# Patient Record
Sex: MALE | Race: WHITE
[De-identification: names, ages, dates, MRNs, and addresses within clinical notes are randomized per-mention and may not be internally consistent; named-entity substitution may affect disease eponyms.]

---

## 2017-08-04 ENCOUNTER — HOSPITAL ENCOUNTER (INPATIENT)
Dept: HOSPITAL 63 - 1 SOUTH | Age: 72
LOS: 1 days | Discharge: HOME | DRG: 293 | End: 2017-08-05
Attending: FAMILY MEDICINE | Admitting: FAMILY MEDICINE
Payer: MEDICARE

## 2017-08-04 VITALS — BODY MASS INDEX: 33.69 KG/M2 | HEIGHT: 69 IN | WEIGHT: 227.5 LBS

## 2017-08-04 VITALS — DIASTOLIC BLOOD PRESSURE: 70 MMHG | SYSTOLIC BLOOD PRESSURE: 136 MMHG

## 2017-08-04 VITALS — SYSTOLIC BLOOD PRESSURE: 137 MMHG | DIASTOLIC BLOOD PRESSURE: 78 MMHG

## 2017-08-04 VITALS — DIASTOLIC BLOOD PRESSURE: 60 MMHG | SYSTOLIC BLOOD PRESSURE: 112 MMHG

## 2017-08-04 DIAGNOSIS — G47.30: ICD-10-CM

## 2017-08-04 DIAGNOSIS — I25.10: ICD-10-CM

## 2017-08-04 DIAGNOSIS — I50.43: Primary | ICD-10-CM

## 2017-08-04 DIAGNOSIS — Z79.899: ICD-10-CM

## 2017-08-04 DIAGNOSIS — Z91.048: ICD-10-CM

## 2017-08-04 DIAGNOSIS — Z80.9: ICD-10-CM

## 2017-08-04 DIAGNOSIS — Z79.82: ICD-10-CM

## 2017-08-04 DIAGNOSIS — Z95.5: ICD-10-CM

## 2017-08-04 LAB
ALBUMIN SERPL-MCNC: 3.9 G/DL (ref 3.4–5)
ALBUMIN/GLOB SERPL: 1.3 {RATIO} (ref 1–1.7)
ALP SERPL-CCNC: 48 U/L (ref 46–116)
ALT SERPL-CCNC: 35 U/L (ref 16–63)
ANION GAP SERPL CALC-SCNC: 5 MMOL/L (ref 6–14)
APTT PPP: (no result) S
AST SERPL-CCNC: 24 U/L (ref 15–37)
BACTERIA #/AREA URNS HPF: 0 /HPF
BASOPHILS # BLD AUTO: 0.1 X10^3/UL (ref 0–0.2)
BASOPHILS NFR BLD: 1 % (ref 0–3)
BILIRUB SERPL-MCNC: 0.3 MG/DL (ref 0.2–1)
BILIRUB UR QL STRIP: (no result)
BUN/CREAT SERPL: 16 (ref 6–20)
CA-I SERPL ISE-MCNC: 18 MG/DL (ref 8–26)
CALCIUM SERPL-MCNC: 10 MG/DL (ref 8.5–10.1)
CHLORIDE SERPL-SCNC: 107 MMOL/L (ref 98–107)
CK SERPL-CCNC: 222 U/L (ref 39–308)
CO2 SERPL-SCNC: 30 MMOL/L (ref 21–32)
CREAT SERPL-MCNC: 1.1 MG/DL (ref 0.7–1.3)
EOSINOPHIL NFR BLD: 0.2 X10^3/UL (ref 0–0.7)
EOSINOPHIL NFR BLD: 2 % (ref 0–3)
ERYTHROCYTE [DISTWIDTH] IN BLOOD BY AUTOMATED COUNT: 13.6 % (ref 11.5–14.5)
FIBRINOGEN PPP-MCNC: CLEAR MG/DL
GFR SERPLBLD BASED ON 1.73 SQ M-ARVRAT: 65.6 ML/MIN
GLOBULIN SER-MCNC: 3 G/DL (ref 2.2–3.8)
GLUCOSE SERPL-MCNC: 144 MG/DL (ref 70–99)
GLUCOSE UR STRIP-MCNC: (no result) MG/DL
HCT VFR BLD CALC: 44 % (ref 39–53)
HGB BLD-MCNC: 14.6 G/DL (ref 13–17.5)
LYMPHOCYTES # BLD: 2.2 X10^3/UL (ref 1–4.8)
LYMPHOCYTES NFR BLD AUTO: 34 % (ref 24–48)
MCH RBC QN AUTO: 30 PG (ref 25–35)
MCHC RBC AUTO-ENTMCNC: 33 G/DL (ref 31–37)
MCV RBC AUTO: 91 FL (ref 79–100)
MONO #: 0.7 X10^3/UL (ref 0–1.1)
MONOCYTES NFR BLD: 11 % (ref 0–9)
NEUT #: 3.4 X10^3UL (ref 1.8–7.7)
NEUTROPHILS NFR BLD AUTO: 52 % (ref 31–73)
NITRITE UR QL STRIP: (no result)
PLATELET # BLD AUTO: 166 X10^3/UL (ref 140–400)
POTASSIUM SERPL-SCNC: 4.2 MMOL/L (ref 3.5–5.1)
PROT SERPL-MCNC: 6.9 G/DL (ref 6.4–8.2)
RBC # BLD AUTO: 4.83 X10^6/UL (ref 4.3–5.7)
RBC #/AREA URNS HPF: (no result) /HPF (ref 0–2)
SODIUM SERPL-SCNC: 142 MMOL/L (ref 136–145)
SP GR UR STRIP: 1.01
SQUAMOUS #/AREA URNS LPF: (no result) /LPF
UROBILINOGEN UR-MCNC: 0.2 MG/DL
WBC # BLD AUTO: 6.5 X10^3/UL (ref 4–11)
WBC #/AREA URNS HPF: 0 /HPF (ref 0–4)

## 2017-08-04 PROCEDURE — 90732 PPSV23 VACC 2 YRS+ SUBQ/IM: CPT

## 2017-08-04 PROCEDURE — 93306 TTE W/DOPPLER COMPLETE: CPT

## 2017-08-04 PROCEDURE — 80048 BASIC METABOLIC PNL TOTAL CA: CPT

## 2017-08-04 PROCEDURE — 81001 URINALYSIS AUTO W/SCOPE: CPT

## 2017-08-04 PROCEDURE — 82550 ASSAY OF CK (CPK): CPT

## 2017-08-04 PROCEDURE — 36415 COLL VENOUS BLD VENIPUNCTURE: CPT

## 2017-08-04 PROCEDURE — 80053 COMPREHEN METABOLIC PANEL: CPT

## 2017-08-04 PROCEDURE — 84484 ASSAY OF TROPONIN QUANT: CPT

## 2017-08-04 PROCEDURE — 85379 FIBRIN DEGRADATION QUANT: CPT

## 2017-08-04 PROCEDURE — 85027 COMPLETE CBC AUTOMATED: CPT

## 2017-08-04 PROCEDURE — 83880 ASSAY OF NATRIURETIC PEPTIDE: CPT

## 2017-08-04 RX ADMIN — FUROSEMIDE SCH MG: 10 INJECTION, SOLUTION INTRAMUSCULAR; INTRAVENOUS at 20:40

## 2017-08-04 RX ADMIN — NIACIN SCH MG: 500 TABLET, FILM COATED, EXTENDED RELEASE ORAL at 20:40

## 2017-08-04 RX ADMIN — Medication SCH MG: at 20:40

## 2017-08-04 NOTE — NUR
The patient, NAYELY BECKER, 71 y/o, M admitted by MEHDI ROSALES MD, was given 
written information regarding hospital policies, unit procedures and contact persons.  



Patient admitted to room 124 from Dr. Rosales's office and arrived at approximately 1500. 
Valuables were checked and left with patient. Vital signs assessed and IV placed.

## 2017-08-05 VITALS
SYSTOLIC BLOOD PRESSURE: 132 MMHG | DIASTOLIC BLOOD PRESSURE: 80 MMHG | SYSTOLIC BLOOD PRESSURE: 132 MMHG | DIASTOLIC BLOOD PRESSURE: 80 MMHG

## 2017-08-05 VITALS — SYSTOLIC BLOOD PRESSURE: 135 MMHG | DIASTOLIC BLOOD PRESSURE: 58 MMHG

## 2017-08-05 VITALS — SYSTOLIC BLOOD PRESSURE: 153 MMHG | DIASTOLIC BLOOD PRESSURE: 84 MMHG

## 2017-08-05 LAB
ANION GAP SERPL CALC-SCNC: 4 MMOL/L (ref 6–14)
BASOPHILS # BLD AUTO: 0.1 X10^3/UL (ref 0–0.2)
BASOPHILS NFR BLD: 1 % (ref 0–3)
CA-I SERPL ISE-MCNC: 19 MG/DL (ref 8–26)
CALCIUM SERPL-MCNC: 10.1 MG/DL (ref 8.5–10.1)
CHLORIDE SERPL-SCNC: 107 MMOL/L (ref 98–107)
CO2 SERPL-SCNC: 31 MMOL/L (ref 21–32)
CREAT SERPL-MCNC: 1.1 MG/DL (ref 0.7–1.3)
EOSINOPHIL NFR BLD: 0.2 X10^3/UL (ref 0–0.7)
EOSINOPHIL NFR BLD: 4 % (ref 0–3)
ERYTHROCYTE [DISTWIDTH] IN BLOOD BY AUTOMATED COUNT: 13.3 % (ref 11.5–14.5)
GFR SERPLBLD BASED ON 1.73 SQ M-ARVRAT: 65.6 ML/MIN
GLUCOSE SERPL-MCNC: 135 MG/DL (ref 70–99)
HCT VFR BLD CALC: 44.2 % (ref 39–53)
HGB BLD-MCNC: 14.7 G/DL (ref 13–17.5)
LYMPHOCYTES # BLD: 2.4 X10^3/UL (ref 1–4.8)
LYMPHOCYTES NFR BLD AUTO: 36 % (ref 24–48)
MCH RBC QN AUTO: 30 PG (ref 25–35)
MCHC RBC AUTO-ENTMCNC: 33 G/DL (ref 31–37)
MCV RBC AUTO: 91 FL (ref 79–100)
MONO #: 0.8 X10^3/UL (ref 0–1.1)
MONOCYTES NFR BLD: 12 % (ref 0–9)
NEUT #: 3.1 X10^3UL (ref 1.8–7.7)
NEUTROPHILS NFR BLD AUTO: 47 % (ref 31–73)
PLATELET # BLD AUTO: 159 X10^3/UL (ref 140–400)
POTASSIUM SERPL-SCNC: 4.8 MMOL/L (ref 3.5–5.1)
RBC # BLD AUTO: 4.88 X10^6/UL (ref 4.3–5.7)
SODIUM SERPL-SCNC: 142 MMOL/L (ref 136–145)
WBC # BLD AUTO: 6.6 X10^3/UL (ref 4–11)

## 2017-08-05 RX ADMIN — Medication SCH MG: at 08:49

## 2017-08-05 RX ADMIN — FUROSEMIDE SCH MG: 10 INJECTION, SOLUTION INTRAMUSCULAR; INTRAVENOUS at 08:50

## 2017-08-05 RX ADMIN — NIACIN SCH MG: 500 TABLET, FILM COATED, EXTENDED RELEASE ORAL at 08:49

## 2017-08-05 NOTE — CARD
--------------- APPROVED REPORT --------------





EXAM: Two-dimensional and M-mode echocardiogram with Doppler and color Doppler.



Other Information 

Quality : GoodHR: 76bpm

Rhythm : NSR



INDICATION

Congestive Heart Failure 



RISK FACTORS

Obesity   



2D DIMENSIONS 

RVDd3.4 (2.9-3.5cm)Left Atrium(2D)3.6 (1.6-4.0cm)

IVSd0.8 (0.7-1.1cm)Aortic Root(2D)2.8 (2.0-3.7cm)

LVDd4.6 (3.9-5.9cm)LVOT Diameter2.2 (1.8-2.4cm)

PWd0.8 (0.7-1.1cm)LVDs2.8 (2.5-4.0cm)

FS (%) 39.4 %SV66.7 ml



Aortic Valve

AoV Peak Duane.177.6cm/sAoV VTI32.3cm

AO Peak GR.12.6mmHgLVOT Peak Duane.162.7cm/s

LVOT  VTI 34.60cmAO Mean GR.8mmHg

LOREE (VMAX)3.24so8UOY   (VTI)4.17cm2



Mitral Valve

MV E Jkxcdhss56.3cm/sMV E Peak Gr.4mmHg

MV DECEL [id number] A Cxoiryed29.8cm/s

MV E Mean Gr.2mmHgE/A  Ratio0.8

MV A [id number]



Pulmonary Valve

PV Peak Jejuqkgj094.4cm/sPV Peak Grad.10mmHg



Pulmonary Vein

S1 Yryypzjc25.7cm/sD2 Uiwzhpzn32.4cm/s



 LEFT VENTRICLE 

The left ventricle is normal size. There is borderline concentric left ventricular hypertrophy. The l
eft ventricular systolic function is normal and the ejection fraction is within normal range. The Eje
ction Fraction is 65-70%. There is normal LV segmental wall motion. Transmitral Doppler flow pattern 
is Grade I-abnormal relaxation pattern.



 RIGHT VENTRICLE 

The right ventricle is normal size. There is normal right ventricular wall thickness. The right ventr
icular systolic function is normal.



 ATRIA 

The left atrium size is normal. The right atrium size is normal. The interatrial septum is intact wit
h no evidence for an atrial septal defect or patent foramen ovale as noted on 2-D or Doppler imaging.




 AORTIC VALVE 

The aortic valve is mildly sclerotic. The aortic valve is trileaflet. Doppler and Color Flow revealed
 no significant aortic regurgitation. There is no significant aortic valvular stenosis.



 MITRAL VALVE 

Mitral annular calcification is mild. The mitral valve leaflets are thickened. There is no evidence o
f mitral valve prolapse. There is no mitral valve stenosis. Doppler and Color Flow revealed trace tyra
ral valve regurgitation.



 TRICUSPID VALVE 

Doppler and Color Flow revealed trace tricuspid valve regurgitation.



 PULMONIC VALVE 

The pulmonic valve is not well visualized but appears to open well. No evidence of pulmonic valve reg
urgitation or stenosis by color and spectral Doppler.



 GREAT VESSELS 

The aortic root is normal in size. The ascending aorta is normal in size. The pulmonary artery is nor
mal. The IVC is normal in size and collapses >50% with inspiration.



 PERICARDIAL EFFUSION 

There is no evidence of significant pericardial effusion.



Critical Notification

Critical Value: No



<Conclusion>

The left ventricle is normal size.

The left ventricular systolic function is normal and the ejection fraction is within normal range.

The Ejection Fraction is 65-70%.

There is borderline concentric left ventricular hypertrophy.

There is no significant aortic valvular stenosis.

Doppler and Color Flow revealed no significant aortic regurgitation.

Doppler and Color Flow revealed trace mitral valve regurgitation.

Doppler and Color Flow revealed trace tricuspid valve regurgitation.

There is no evidence of significant pericardial effusion.

## 2017-12-02 ENCOUNTER — HOSPITAL ENCOUNTER (OUTPATIENT)
Dept: HOSPITAL 63 - LAB | Age: 72
Discharge: HOME | End: 2017-12-02
Attending: FAMILY MEDICINE
Payer: MEDICARE

## 2017-12-02 DIAGNOSIS — R04.0: Primary | ICD-10-CM

## 2017-12-02 LAB
ANION GAP SERPL CALC-SCNC: 7 MMOL/L (ref 6–14)
BASOPHILS # BLD AUTO: 0.1 X10^3/UL (ref 0–0.2)
BASOPHILS NFR BLD: 1 % (ref 0–3)
CA-I SERPL ISE-MCNC: 14 MG/DL (ref 8–26)
CALCIUM SERPL-MCNC: 10.3 MG/DL (ref 8.5–10.1)
CHLORIDE SERPL-SCNC: 104 MMOL/L (ref 98–107)
CO2 SERPL-SCNC: 31 MMOL/L (ref 21–32)
CREAT SERPL-MCNC: 1 MG/DL (ref 0.7–1.3)
EOSINOPHIL NFR BLD: 0.2 X10^3/UL (ref 0–0.7)
EOSINOPHIL NFR BLD: 2 % (ref 0–3)
ERYTHROCYTE [DISTWIDTH] IN BLOOD BY AUTOMATED COUNT: 13.5 % (ref 11.5–14.5)
GFR SERPLBLD BASED ON 1.73 SQ M-ARVRAT: 73.2 ML/MIN
GLUCOSE SERPL-MCNC: 90 MG/DL (ref 70–99)
HCT VFR BLD CALC: 45 % (ref 39–53)
HGB BLD-MCNC: 15.5 G/DL (ref 13–17.5)
LYMPHOCYTES # BLD: 2.4 X10^3/UL (ref 1–4.8)
LYMPHOCYTES NFR BLD AUTO: 26 % (ref 24–48)
MCH RBC QN AUTO: 31 PG (ref 25–35)
MCHC RBC AUTO-ENTMCNC: 34 G/DL (ref 31–37)
MCV RBC AUTO: 90 FL (ref 79–100)
MONO #: 0.8 X10^3/UL (ref 0–1.1)
MONOCYTES NFR BLD: 9 % (ref 0–9)
NEUT #: 5.9 X10^3UL (ref 1.8–7.7)
NEUTROPHILS NFR BLD AUTO: 63 % (ref 31–73)
PLATELET # BLD AUTO: 204 X10^3/UL (ref 140–400)
POTASSIUM SERPL-SCNC: 4.1 MMOL/L (ref 3.5–5.1)
RBC # BLD AUTO: 5.02 X10^6/UL (ref 4.3–5.7)
SODIUM SERPL-SCNC: 142 MMOL/L (ref 136–145)
WBC # BLD AUTO: 9.2 X10^3/UL (ref 4–11)

## 2017-12-02 PROCEDURE — 85025 COMPLETE CBC W/AUTO DIFF WBC: CPT

## 2017-12-02 PROCEDURE — 80048 BASIC METABOLIC PNL TOTAL CA: CPT

## 2017-12-02 PROCEDURE — 36415 COLL VENOUS BLD VENIPUNCTURE: CPT

## 2017-12-02 PROCEDURE — 85610 PROTHROMBIN TIME: CPT

## 2021-08-05 ENCOUNTER — HOSPITAL ENCOUNTER (INPATIENT)
Dept: HOSPITAL 63 - 1 SOUTH | Age: 76
LOS: 5 days | Discharge: ANOTHER HOSPITAL | DRG: 186 | End: 2021-08-10
Attending: FAMILY MEDICINE | Admitting: FAMILY MEDICINE
Payer: MEDICARE

## 2021-08-05 VITALS — SYSTOLIC BLOOD PRESSURE: 117 MMHG | DIASTOLIC BLOOD PRESSURE: 81 MMHG

## 2021-08-05 VITALS — HEIGHT: 68 IN | WEIGHT: 190.04 LBS | BODY MASS INDEX: 28.8 KG/M2

## 2021-08-05 DIAGNOSIS — Z95.828: ICD-10-CM

## 2021-08-05 DIAGNOSIS — Z98.61: ICD-10-CM

## 2021-08-05 DIAGNOSIS — I25.10: ICD-10-CM

## 2021-08-05 DIAGNOSIS — E43: ICD-10-CM

## 2021-08-05 DIAGNOSIS — E66.01: ICD-10-CM

## 2021-08-05 DIAGNOSIS — J90: Primary | ICD-10-CM

## 2021-08-05 DIAGNOSIS — E11.9: ICD-10-CM

## 2021-08-05 LAB
ALBUMIN SERPL-MCNC: 2.7 G/DL (ref 3.4–5)
ALBUMIN/GLOB SERPL: 0.7 {RATIO} (ref 1–1.7)
ALP SERPL-CCNC: 86 U/L (ref 46–116)
ALT SERPL-CCNC: 15 U/L (ref 16–63)
ANION GAP SERPL CALC-SCNC: 5 MMOL/L (ref 6–14)
APTT PPP: (no result) S
AST SERPL-CCNC: 15 U/L (ref 15–37)
BACTERIA #/AREA URNS HPF: 0 /HPF
BASOPHILS # BLD AUTO: 0.1 X10^3/UL (ref 0–0.2)
BASOPHILS NFR BLD: 1 % (ref 0–3)
BILIRUB SERPL-MCNC: 0.2 MG/DL (ref 0.2–1)
BILIRUB UR QL STRIP: (no result)
BUN/CREAT SERPL: 11 (ref 6–20)
CA-I SERPL ISE-MCNC: 10 MG/DL (ref 8–26)
CALCIUM SERPL-MCNC: 9.8 MG/DL (ref 8.5–10.1)
CHLORIDE SERPL-SCNC: 103 MMOL/L (ref 98–107)
CO2 SERPL-SCNC: 30 MMOL/L (ref 21–32)
CREAT SERPL-MCNC: 0.9 MG/DL (ref 0.7–1.3)
EOSINOPHIL NFR BLD: 0.4 X10^3/UL (ref 0–0.7)
EOSINOPHIL NFR BLD: 4 % (ref 0–3)
ERYTHROCYTE [DISTWIDTH] IN BLOOD BY AUTOMATED COUNT: 15.1 % (ref 11.5–14.5)
FIBRINOGEN PPP-MCNC: CLEAR MG/DL
GFR SERPLBLD BASED ON 1.73 SQ M-ARVRAT: 81.8 ML/MIN
GLOBULIN SER-MCNC: 4 G/DL (ref 2.2–3.8)
GLUCOSE SERPL-MCNC: 156 MG/DL (ref 70–99)
GLUCOSE UR STRIP-MCNC: (no result) MG/DL
HCT VFR BLD CALC: 36.4 % (ref 39–53)
HGB BLD-MCNC: 11.7 G/DL (ref 13–17.5)
LYMPHOCYTES # BLD: 1.5 X10^3/UL (ref 1–4.8)
LYMPHOCYTES NFR BLD AUTO: 15 % (ref 24–48)
MAGNESIUM SERPL-MCNC: 2.1 MG/DL (ref 1.8–2.4)
MCH RBC QN AUTO: 26 PG (ref 25–35)
MCHC RBC AUTO-ENTMCNC: 32 G/DL (ref 31–37)
MCV RBC AUTO: 80 FL (ref 79–100)
MONO #: 1 X10^3/UL (ref 0–1.1)
MONOCYTES NFR BLD: 10 % (ref 0–9)
NEUT #: 7.2 X10^3UL (ref 1.8–7.7)
NEUTROPHILS NFR BLD AUTO: 70 % (ref 31–73)
NITRITE UR QL STRIP: (no result)
PLATELET # BLD AUTO: 324 X10^3/UL (ref 140–400)
POTASSIUM SERPL-SCNC: 4.1 MMOL/L (ref 3.5–5.1)
PROT SERPL-MCNC: 6.7 G/DL (ref 6.4–8.2)
RBC # BLD AUTO: 4.52 X10^6/UL (ref 4.3–5.7)
RBC #/AREA URNS HPF: (no result) /HPF (ref 0–2)
SODIUM SERPL-SCNC: 138 MMOL/L (ref 136–145)
SP GR UR STRIP: 1.01
SQUAMOUS #/AREA URNS LPF: (no result) /LPF
UROBILINOGEN UR-MCNC: 0.2 MG/DL
WBC # BLD AUTO: 10.2 X10^3/UL (ref 4–11)
WBC #/AREA URNS HPF: 0 /HPF (ref 0–4)

## 2021-08-05 PROCEDURE — 85610 PROTHROMBIN TIME: CPT

## 2021-08-05 PROCEDURE — 82947 ASSAY GLUCOSE BLOOD QUANT: CPT

## 2021-08-05 PROCEDURE — 87071 CULTURE AEROBIC QUANT OTHER: CPT

## 2021-08-05 PROCEDURE — 82042 OTHER SOURCE ALBUMIN QUAN EA: CPT

## 2021-08-05 PROCEDURE — 80048 BASIC METABOLIC PNL TOTAL CA: CPT

## 2021-08-05 PROCEDURE — 93005 ELECTROCARDIOGRAM TRACING: CPT

## 2021-08-05 PROCEDURE — 83615 LACTATE (LD) (LDH) ENZYME: CPT

## 2021-08-05 PROCEDURE — 71046 X-RAY EXAM CHEST 2 VIEWS: CPT

## 2021-08-05 PROCEDURE — 80053 COMPREHEN METABOLIC PANEL: CPT

## 2021-08-05 PROCEDURE — 84484 ASSAY OF TROPONIN QUANT: CPT

## 2021-08-05 PROCEDURE — 84157 ASSAY OF PROTEIN OTHER: CPT

## 2021-08-05 PROCEDURE — 36415 COLL VENOUS BLD VENIPUNCTURE: CPT

## 2021-08-05 PROCEDURE — 71250 CT THORAX DX C-: CPT

## 2021-08-05 PROCEDURE — 82550 ASSAY OF CK (CPK): CPT

## 2021-08-05 PROCEDURE — 82150 ASSAY OF AMYLASE: CPT

## 2021-08-05 PROCEDURE — 83735 ASSAY OF MAGNESIUM: CPT

## 2021-08-05 PROCEDURE — 32555 ASPIRATE PLEURA W/ IMAGING: CPT

## 2021-08-05 PROCEDURE — 82945 GLUCOSE OTHER FLUID: CPT

## 2021-08-05 PROCEDURE — 85049 AUTOMATED PLATELET COUNT: CPT

## 2021-08-05 PROCEDURE — 83880 ASSAY OF NATRIURETIC PEPTIDE: CPT

## 2021-08-05 PROCEDURE — 85025 COMPLETE CBC W/AUTO DIFF WBC: CPT

## 2021-08-05 PROCEDURE — 87075 CULTR BACTERIA EXCEPT BLOOD: CPT

## 2021-08-05 PROCEDURE — 81001 URINALYSIS AUTO W/SCOPE: CPT

## 2021-08-05 PROCEDURE — 83036 HEMOGLOBIN GLYCOSYLATED A1C: CPT

## 2021-08-05 PROCEDURE — 85730 THROMBOPLASTIN TIME PARTIAL: CPT

## 2021-08-05 PROCEDURE — 82570 ASSAY OF URINE CREATININE: CPT

## 2021-08-05 RX ADMIN — ASPIRIN 325 MG ORAL TABLET SCH MG: 325 PILL ORAL at 20:18

## 2021-08-05 NOTE — RAD
PQRS Compliance Statement:



One or more of the following individualized dose reduction techniques were utilized for this examinat
ion:  

1. Automated exposure control  

2. Adjustment of the mA and/or kV according to patient size  

3. Use of iterative reconstruction technique



CT THORAX WO 8/5/2021 7:00 PM



Indication: Shortness of breath



COMPARISON: None available.



TECHNIQUE: Multiple axial CT images of the chest were obtained without intravenous contrast. Coronal 
and sagittal reformats are provided.



FINDINGS:



Ascending thoracic aorta measures 4.1 cm. Heart size within normal limits. Three-vessel coronary ron
ry vascular calcific effusions are present. Thyroid gland is normal in appearance. Limited evaluation
 for hilar lymphadenopathy without intravenous contrast. No pathologically enlarged mediastinal or ax
illary lymph nodes. There is a moderate to large left pleural effusion with atelectasis of the inferi
or lingula and left lower lobe. There is minimal aeration left upper lobe. No pneumothorax. There is 
a 3 mm subpleural solid noncalcified pulmonary nodule in the right lower lobe (series 2, image 37). U
pper abdomen is normal in appearance. No suspicious osseous abnormality is identified. Anterior cervi
arslan discectomy and fusion hardware is partially profiled.



IMPRESSION:



Moderate to large left pleural effusion with adjacent inferior lingula atelectasis and left lower lob
e atelectasis or infiltrate.



3 mm solid subpleural nodule in the right lower lobe. Fleischner guidelines for incidentally detected
 pulmonary nodules suggests no routine follow-up for low risk patients and optional CT at 12 months f
or high risk patients with solid noncalcified pulmonary nodules less than 6 mm in size.



Ectasia of the ascending thoracic aorta measures 4.1 cm.



Electronically signed by: Ely Pineda MD (8/5/2021 8:09 PM) [facility]

## 2021-08-06 VITALS — SYSTOLIC BLOOD PRESSURE: 126 MMHG | DIASTOLIC BLOOD PRESSURE: 77 MMHG

## 2021-08-06 VITALS — SYSTOLIC BLOOD PRESSURE: 137 MMHG | DIASTOLIC BLOOD PRESSURE: 79 MMHG

## 2021-08-06 VITALS — SYSTOLIC BLOOD PRESSURE: 128 MMHG | DIASTOLIC BLOOD PRESSURE: 75 MMHG

## 2021-08-06 VITALS — SYSTOLIC BLOOD PRESSURE: 125 MMHG | DIASTOLIC BLOOD PRESSURE: 81 MMHG

## 2021-08-06 VITALS — DIASTOLIC BLOOD PRESSURE: 82 MMHG | SYSTOLIC BLOOD PRESSURE: 148 MMHG

## 2021-08-06 LAB — HBA1C MFR BLD: 7.2 % (ref 4.8–5.6)

## 2021-08-06 RX ADMIN — PANTOPRAZOLE SODIUM SCH MG: 40 TABLET, DELAYED RELEASE ORAL at 08:27

## 2021-08-06 RX ADMIN — INSULIN LISPRO SCH UNITS: 100 INJECTION, SOLUTION INTRAVENOUS; SUBCUTANEOUS at 08:31

## 2021-08-06 RX ADMIN — INSULIN LISPRO SCH UNITS: 100 INJECTION, SOLUTION INTRAVENOUS; SUBCUTANEOUS at 11:55

## 2021-08-06 RX ADMIN — NITROGLYCERIN SCH PATCH: 0.1 PATCH TRANSDERMAL at 08:30

## 2021-08-06 RX ADMIN — ASPIRIN 325 MG ORAL TABLET SCH MG: 325 PILL ORAL at 20:54

## 2021-08-06 RX ADMIN — LOSARTAN POTASSIUM SCH MG: 50 TABLET, FILM COATED ORAL at 08:27

## 2021-08-06 RX ADMIN — VITAMIN D, TAB 1000IU (100/BT) SCH UNIT: 25 TAB at 08:27

## 2021-08-06 RX ADMIN — INSULIN LISPRO SCH UNITS: 100 INJECTION, SOLUTION INTRAVENOUS; SUBCUTANEOUS at 16:59

## 2021-08-07 VITALS — SYSTOLIC BLOOD PRESSURE: 122 MMHG | DIASTOLIC BLOOD PRESSURE: 83 MMHG

## 2021-08-07 VITALS — SYSTOLIC BLOOD PRESSURE: 113 MMHG | DIASTOLIC BLOOD PRESSURE: 70 MMHG

## 2021-08-07 VITALS — DIASTOLIC BLOOD PRESSURE: 69 MMHG | SYSTOLIC BLOOD PRESSURE: 109 MMHG

## 2021-08-07 VITALS — DIASTOLIC BLOOD PRESSURE: 74 MMHG | SYSTOLIC BLOOD PRESSURE: 117 MMHG

## 2021-08-07 VITALS — DIASTOLIC BLOOD PRESSURE: 78 MMHG | SYSTOLIC BLOOD PRESSURE: 121 MMHG

## 2021-08-07 RX ADMIN — INSULIN LISPRO SCH UNITS: 100 INJECTION, SOLUTION INTRAVENOUS; SUBCUTANEOUS at 12:56

## 2021-08-07 RX ADMIN — FUROSEMIDE SCH MG: 10 INJECTION, SOLUTION INTRAMUSCULAR; INTRAVENOUS at 09:27

## 2021-08-07 RX ADMIN — INSULIN LISPRO SCH UNITS: 100 INJECTION, SOLUTION INTRAVENOUS; SUBCUTANEOUS at 17:58

## 2021-08-07 RX ADMIN — ASPIRIN 325 MG ORAL TABLET SCH MG: 325 PILL ORAL at 21:09

## 2021-08-07 RX ADMIN — PANTOPRAZOLE SODIUM SCH MG: 40 TABLET, DELAYED RELEASE ORAL at 09:25

## 2021-08-07 RX ADMIN — NITROGLYCERIN SCH PATCH: 0.1 PATCH TRANSDERMAL at 09:26

## 2021-08-07 RX ADMIN — Medication SCH CAP: at 09:25

## 2021-08-07 RX ADMIN — VITAMIN D, TAB 1000IU (100/BT) SCH UNIT: 25 TAB at 09:25

## 2021-08-07 RX ADMIN — Medication SCH CAP: at 21:09

## 2021-08-07 RX ADMIN — LOSARTAN POTASSIUM SCH MG: 50 TABLET, FILM COATED ORAL at 09:25

## 2021-08-07 RX ADMIN — INSULIN LISPRO SCH UNITS: 100 INJECTION, SOLUTION INTRAVENOUS; SUBCUTANEOUS at 08:00

## 2021-08-08 VITALS — DIASTOLIC BLOOD PRESSURE: 76 MMHG | SYSTOLIC BLOOD PRESSURE: 128 MMHG

## 2021-08-08 VITALS — SYSTOLIC BLOOD PRESSURE: 113 MMHG | DIASTOLIC BLOOD PRESSURE: 69 MMHG

## 2021-08-08 VITALS — DIASTOLIC BLOOD PRESSURE: 75 MMHG | SYSTOLIC BLOOD PRESSURE: 137 MMHG

## 2021-08-08 VITALS — SYSTOLIC BLOOD PRESSURE: 111 MMHG | DIASTOLIC BLOOD PRESSURE: 51 MMHG

## 2021-08-08 VITALS — SYSTOLIC BLOOD PRESSURE: 110 MMHG | DIASTOLIC BLOOD PRESSURE: 64 MMHG

## 2021-08-08 VITALS — DIASTOLIC BLOOD PRESSURE: 71 MMHG | SYSTOLIC BLOOD PRESSURE: 117 MMHG

## 2021-08-08 LAB
ANION GAP SERPL CALC-SCNC: 7 MMOL/L (ref 6–14)
CA-I SERPL ISE-MCNC: 12 MG/DL (ref 8–26)
CALCIUM SERPL-MCNC: 9.7 MG/DL (ref 8.5–10.1)
CHLORIDE SERPL-SCNC: 102 MMOL/L (ref 98–107)
CO2 SERPL-SCNC: 30 MMOL/L (ref 21–32)
CREAT SERPL-MCNC: 0.9 MG/DL (ref 0.7–1.3)
GFR SERPLBLD BASED ON 1.73 SQ M-ARVRAT: 81.8 ML/MIN
GLUCOSE SERPL-MCNC: 125 MG/DL (ref 70–99)
POTASSIUM SERPL-SCNC: 3.9 MMOL/L (ref 3.5–5.1)
SODIUM SERPL-SCNC: 139 MMOL/L (ref 136–145)

## 2021-08-08 RX ADMIN — FUROSEMIDE SCH MG: 10 INJECTION, SOLUTION INTRAMUSCULAR; INTRAVENOUS at 08:24

## 2021-08-08 RX ADMIN — Medication SCH CAP: at 08:25

## 2021-08-08 RX ADMIN — LOSARTAN POTASSIUM SCH MG: 50 TABLET, FILM COATED ORAL at 08:25

## 2021-08-08 RX ADMIN — METOPROLOL SUCCINATE SCH MG: 25 TABLET, EXTENDED RELEASE ORAL at 13:37

## 2021-08-08 RX ADMIN — INSULIN LISPRO SCH UNITS: 100 INJECTION, SOLUTION INTRAVENOUS; SUBCUTANEOUS at 12:10

## 2021-08-08 RX ADMIN — PANTOPRAZOLE SODIUM SCH MG: 40 TABLET, DELAYED RELEASE ORAL at 08:25

## 2021-08-08 RX ADMIN — INSULIN LISPRO SCH UNITS: 100 INJECTION, SOLUTION INTRAVENOUS; SUBCUTANEOUS at 08:00

## 2021-08-08 RX ADMIN — NITROGLYCERIN SCH PATCH: 0.1 PATCH TRANSDERMAL at 08:27

## 2021-08-08 RX ADMIN — INSULIN LISPRO SCH UNITS: 100 INJECTION, SOLUTION INTRAVENOUS; SUBCUTANEOUS at 16:51

## 2021-08-08 RX ADMIN — Medication SCH CAP: at 20:34

## 2021-08-08 RX ADMIN — Medication PRN ML: at 20:34

## 2021-08-08 RX ADMIN — VITAMIN D, TAB 1000IU (100/BT) SCH UNIT: 25 TAB at 08:25

## 2021-08-08 NOTE — PN
SUBJECTIVE:  A 76-year-old male who came in with a large left pleural effusion, 

difficulty breathing and elevated temperature, continues to run low-grade 

temperature about 99.7, pulse upwards of close to 100, blood pressure 125/80, 

respiratory 18, only 91% on room air and that is without exertion.  The patient 

otherwise seems to be doing a little bit better, but still coughing, feels 

heaviness in that left side of his chest.  Blood sugars are being monitored 

carefully and continue on IV Lasix as well as IV antibiotic therapy.



OBJECTIVE:

LUNGS:  Otherwise show good inflation on the right, but the left shows 

diminished breath sounds.

CARDIOVASCULAR:  Regular sinus rhythm.

ABDOMEN: Otherwise protuberant.

EXTREMITIES:  No clubbing, cyanosis or edema.



Continue on the IV Rocephin and make further evaluation on him as indicated once

we get the tap done for thoracentesis.



IMPRESSION:  Left pleural effusion, probably infectious.  Continue with IV 

antibiotic therapy and diuresis for now.







EMA/MILEY/KAYLA

DR: Sunil   DD: 08/07/2021 08:46

DT: 08/07/2021 20:02   TID: [id number]

## 2021-08-08 NOTE — RAD
EXAM: PA and Lateral Views of the Chest



DATE: 8/8/2021 12:30 PM



INDICATION: Reason: effusion / Spl. Instructions:  / History: 



COMPARISON: 8/5/2021



FINDINGS/

IMPRESSION:



Large left pleural effusion. No pneumothorax. Left lung opacities likely atelectasis given the large 
left pleural effusion. 



Cardiomediastinal silhouette is likely stable.



Electronically signed by: Victor Hugo Dawson MD (8/8/2021 1:39 PM) DOUG

## 2021-08-09 VITALS — DIASTOLIC BLOOD PRESSURE: 73 MMHG | SYSTOLIC BLOOD PRESSURE: 120 MMHG

## 2021-08-09 VITALS — DIASTOLIC BLOOD PRESSURE: 71 MMHG | SYSTOLIC BLOOD PRESSURE: 121 MMHG

## 2021-08-09 VITALS — DIASTOLIC BLOOD PRESSURE: 75 MMHG | SYSTOLIC BLOOD PRESSURE: 116 MMHG

## 2021-08-09 VITALS — SYSTOLIC BLOOD PRESSURE: 125 MMHG | DIASTOLIC BLOOD PRESSURE: 75 MMHG

## 2021-08-09 PROCEDURE — 0W9B3ZZ DRAINAGE OF LEFT PLEURAL CAVITY, PERCUTANEOUS APPROACH: ICD-10-PCS | Performed by: FAMILY MEDICINE

## 2021-08-09 RX ADMIN — INSULIN LISPRO SCH UNITS: 100 INJECTION, SOLUTION INTRAVENOUS; SUBCUTANEOUS at 12:00

## 2021-08-09 RX ADMIN — METOPROLOL SUCCINATE SCH MG: 25 TABLET, EXTENDED RELEASE ORAL at 08:40

## 2021-08-09 RX ADMIN — Medication SCH CAP: at 20:04

## 2021-08-09 RX ADMIN — INSULIN LISPRO SCH UNITS: 100 INJECTION, SOLUTION INTRAVENOUS; SUBCUTANEOUS at 08:00

## 2021-08-09 RX ADMIN — LOSARTAN POTASSIUM SCH MG: 50 TABLET, FILM COATED ORAL at 08:40

## 2021-08-09 RX ADMIN — PANTOPRAZOLE SODIUM SCH MG: 40 TABLET, DELAYED RELEASE ORAL at 08:40

## 2021-08-09 RX ADMIN — Medication SCH CAP: at 08:39

## 2021-08-09 RX ADMIN — INSULIN LISPRO SCH UNITS: 100 INJECTION, SOLUTION INTRAVENOUS; SUBCUTANEOUS at 16:32

## 2021-08-09 RX ADMIN — VITAMIN D, TAB 1000IU (100/BT) SCH UNIT: 25 TAB at 08:40

## 2021-08-09 RX ADMIN — FUROSEMIDE SCH MG: 10 INJECTION, SOLUTION INTRAMUSCULAR; INTRAVENOUS at 08:39

## 2021-08-09 RX ADMIN — NITROGLYCERIN SCH PATCH: 0.1 PATCH TRANSDERMAL at 08:41

## 2021-08-09 RX ADMIN — Medication PRN ML: at 20:04

## 2021-08-09 NOTE — PN
DATE: 08/08/2021

SUBJECTIVE:  The patient came in with pleural effusion, shortness of breath and 

dyspnea.  The patient seems to be doing a little bit better today.  He has been 

diuresed vigorously with IV Lasix, also placed on IV antibiotic therapy because 

of his elevated temperature.  The repeat chest x-ray shows a pleural effusion to

be increasing in size and will try to be tapped up tomorrow and make further 

evaluation once that tap has been performed.  Otherwise the patient is resting 

comfortably.



OBJECTIVE:

VITAL SIGNS:  Blood pressure 140/70, respiratory rate 20, pulse 65, 97% on room 

air, 98.5 temperature.

GENERAL:  The patient is alert and oriented.

LUNGS:  Diminished, primarily on the left side.  There is no air movement, 

although he is not struggling for air.  No accessory muscle use, although he has

have a marked decreased percussion on the left side.

CARDIOVASCULAR:  Regular sinus rhythm.

ABDOMEN:  Soft, nontender.

EXTREMITIES:  No clubbing, cyanosis.  Trace edema.

NEUROLOGIC:  His speech is fluent, spontaneous, and appropriate throughout.







EMA/LYNN/LEIDA

DR: Sunil   DD: 08/08/2021 13:02

DT: 08/08/2021 21:48   TID: [id number]

## 2021-08-09 NOTE — PN
SUBJECTIVE:  The patient is a 76-year-old male in with large left lobe pleural 

effusion.  The patient had a successful thoracentesis, 1-1/2 liters of fluid 

taken off that lung.



OBJECTIVE:

VITAL SIGNS:  Remain stable 120/70, respiratory rate 18, pulse 80, afebrile, 2 

liters at 93%.



The patient continues to be monitored.  We will repeat chest x-ray to see how 

this has evolved and what other cultures and sensitivities on the chemistries 

from the fluid need to be evaluated as well as cell count and other parameters 

as indicated.



IMPRESSION:  Large left pleural effusion, pleurisy, type 2 diabetes.



PLAN: As above.  Continue on antibiotics for now until final cultures were 

obtained for now.







EMA/GE

DR: Sunil   DD: 08/09/2021 13:25

DT: 08/09/2021 23:39   TID: [id number]

## 2021-08-09 NOTE — RAD
US THORACENTESIS LOCAL LEFT



History:Reason: LEFT PL EFFUSION / Spl. Instructions:  / History: 



Comparison: None



Technique: After discussing the risk and benefits of the procedure, the patient signed a written cons
ent form for ultrasound guided thoracentesis of left pleural effusion. Appropriate relevant laborator
y findings were reviewed prior to the exam. Initial ultrasound examination of the left hemithorax dem
onstrated the presence of a left pleural effusion. External skin site was prepped and draped in the u
sual sterile fashion at the planned site of access. ChloraPrep was utilized for cleansing solution. 1
% percent lidocaine was utilized for local anesthesia. 5 Slovenian Yueh needle set was utilized to acces
s the pleural effusion. Subsequently fluid was aspirated utilizing vacuum bottles. A total of approxi
mately 1.5 liters of serosanguineous fluid removed. Catheter was removed. There were no immediate com
plications.



Findings: There was jordan red pleural fluid. 



Impression: 

1.  Successful ultrasound-guided left thoracentesis without immediate complication.



Electronically signed by: Ryne Clarke DO (8/9/2021 1:05 PM) [id number]

## 2021-08-10 VITALS — DIASTOLIC BLOOD PRESSURE: 81 MMHG | SYSTOLIC BLOOD PRESSURE: 132 MMHG

## 2021-08-10 VITALS — DIASTOLIC BLOOD PRESSURE: 66 MMHG | SYSTOLIC BLOOD PRESSURE: 121 MMHG

## 2021-08-10 VITALS
SYSTOLIC BLOOD PRESSURE: 105 MMHG | DIASTOLIC BLOOD PRESSURE: 67 MMHG | SYSTOLIC BLOOD PRESSURE: 105 MMHG | SYSTOLIC BLOOD PRESSURE: 105 MMHG | DIASTOLIC BLOOD PRESSURE: 67 MMHG | DIASTOLIC BLOOD PRESSURE: 67 MMHG

## 2021-08-10 VITALS — SYSTOLIC BLOOD PRESSURE: 110 MMHG | DIASTOLIC BLOOD PRESSURE: 60 MMHG

## 2021-08-10 RX ADMIN — METOPROLOL SUCCINATE SCH MG: 25 TABLET, EXTENDED RELEASE ORAL at 08:02

## 2021-08-10 RX ADMIN — INSULIN LISPRO SCH UNITS: 100 INJECTION, SOLUTION INTRAVENOUS; SUBCUTANEOUS at 16:24

## 2021-08-10 RX ADMIN — PANTOPRAZOLE SODIUM SCH MG: 40 TABLET, DELAYED RELEASE ORAL at 08:02

## 2021-08-10 RX ADMIN — Medication SCH CAP: at 08:02

## 2021-08-10 RX ADMIN — VITAMIN D, TAB 1000IU (100/BT) SCH UNIT: 25 TAB at 08:02

## 2021-08-10 RX ADMIN — INSULIN LISPRO SCH UNITS: 100 INJECTION, SOLUTION INTRAVENOUS; SUBCUTANEOUS at 12:18

## 2021-08-10 RX ADMIN — NITROGLYCERIN SCH PATCH: 0.1 PATCH TRANSDERMAL at 08:03

## 2021-08-10 RX ADMIN — LOSARTAN POTASSIUM SCH MG: 50 TABLET, FILM COATED ORAL at 08:05

## 2021-08-10 RX ADMIN — FUROSEMIDE SCH MG: 10 INJECTION, SOLUTION INTRAMUSCULAR; INTRAVENOUS at 08:04

## 2021-08-10 RX ADMIN — INSULIN LISPRO SCH UNITS: 100 INJECTION, SOLUTION INTRAVENOUS; SUBCUTANEOUS at 07:44

## 2021-08-10 RX ADMIN — Medication PRN ML: at 09:28

## 2021-08-10 NOTE — RAD
EXAM: CHEST 2 VIEWS.



HISTORY: Cough, pleural effusion.



COMPARISON: 08/08/2021.



FINDINGS: Frontal and lateral views of the chest are obtained.



A moderate left pleural effusion is decreased since the prior study. There is no pneumothorax. There 
is atelectasis in the left greater than right bases. The inspiration is small. The heart is not enlar
ged. There are atherosclerotic calcifications of the aorta. Changes of cervical fusion are noted. 



IMPRESSION:

1. A moderate left pleural effusion has decreased since the prior study.



Electronically signed by: HANNAH Guy MD (8/10/2021 10:33 AM) [id number]

## 2021-08-10 NOTE — EKG
[facility] [address], [place] [address]

Test Date:    2021               Test Time:    05:39:01

Pat Name:     NAYELY BECKER         Department:   

Patient ID:   [id number]           Room:         115 A

Gender:       M                        Technician:   

:          [id number]               Requested By: MEHDI ROSALES

Order Number: [id number]            Reading MD:     

                                 Measurements

Intervals                              Axis          

Rate:         84                       P:            48

TN:           248                      QRS:          -24

QRSD:         66                       T:            9

QT:           330                                    

QTc:          393                                    

                           Interpretive Statements

SINUS RHYTHM

PROLONGED TN INTERVAL

LEFTWARD AXIS

R-S TRANSITION ZONE IN V LEADS DISPLACED TO THE RIGHT

LOW VOLTAGE

QRS(T) CONTOUR ABNORMALITY

CONSISTENT WITH INFERIOR INFARCT

PROBABLY OLD

ABNORMAL ECG

RI6.01

No previous ECG available for comparison

## 2021-08-10 NOTE — PN
SUBJECTIVE:  The patient is a 76-year-old gentleman came in with a pleural 

effusion, shortness of breath, and dyspnea.  The patient is resting comfortably,

although he had a 1 liter and a half of fluid taken off his left lung.  The 

patient has a reoccurrence of that pleural effusion.  The patient is still 

moderate amount.



OBJECTIVE:

VITAL SIGNS:  Blood pressure 120/60, respiratory rate 20, pulse 83, afebrile, 92

on room air.

GENERAL:  The patient otherwise alert and oriented.

LUNGS:  Diminished.  On the left, there is no movement of air, primarily from 

about pen[facility] down on the left lung.  CVR exam has good movement of air.

CARDIOVASCULAR:  Regular sinus rhythm.

ABDOMEN:  Soft, protuberant.

EXTREMITIES:  No clubbing, cyanosis or edema.

NEUROLOGIC:  The patient alert and oriented x3.



IMPRESSION AND PLAN:  Recurrent pleural effusion.  The cultures and workup on 

that fluid taken out yesterday are still pending, but because this fluid has 

reoccurred in a moderate amount, he is probably filling up again, be transferred

for pulmonary consult down at [facility].







ALFREDO CA: Sunil   DD: 08/10/2021 13:18

DT: 08/10/2021 21:53   TID: [id number]

## 2021-08-16 NOTE — DS
DATE OF DISCHARGE: 08/10/2021

HOSPITAL COURSE:  A 76-year-old gentleman with left-sided chest pain, shortness 

of breath.  The patient has been having problems with a recurrent pleural 

effusion.  The patient also has pleurisy, type 2 diabetes, history of coronary 

artery disease.  The patient had approximately a liter and a half of fluid drawn

off and then began to have experience further buildup of the fluid.  As a result

of this, recurrence of the pleural effusion, the patient was sent to [facility] to be seen by Pulmonology and make further evaluation on him for 

the results of further testing as indicated.  His white count was 10, hemoglobin

36, platelets 328.  The patient's body source of the pleural effusion, glucose 

126, total protein 4.5, albumin 2.6, .  Amylase 25 and creatinine of 0.8.

 The patient was stable at the time of discharge because there was no 

pulmonologist available for further consultation.  He was transferred down to 

[facility].



IMPRESSION:

1.  Recurrent pleural effusion.

2.  Dyspnea.

3.  Chest pain.

4.  Pleurisy.

5.  Type 2 diabetes.

6.  Morbid obesity.

7.  Weight loss.



DISPOSITION:  The patient will be transferred via EMS as indicated above.







MIAH

DR: Sunil   DD: 08/16/2021 13:43

DT: 08/16/2021 20:54   TID: [id number]

## 2021-08-19 VITALS
SYSTOLIC BLOOD PRESSURE: 115 MMHG | DIASTOLIC BLOOD PRESSURE: 71 MMHG | SYSTOLIC BLOOD PRESSURE: 115 MMHG | DIASTOLIC BLOOD PRESSURE: 71 MMHG

## 2021-08-23 ENCOUNTER — HOSPITAL ENCOUNTER (OUTPATIENT)
Dept: HOSPITAL 61 - ONCLAB | Age: 76
End: 2021-08-23
Attending: INTERNAL MEDICINE
Payer: MEDICARE

## 2021-08-23 DIAGNOSIS — C34.00: Primary | ICD-10-CM

## 2021-08-23 LAB
ALBUMIN SERPL-MCNC: 2.3 G/DL (ref 3.4–5)
ALBUMIN/GLOB SERPL: 0.5 {RATIO} (ref 1–1.7)
ALP SERPL-CCNC: 73 U/L (ref 46–116)
ALT SERPL-CCNC: 21 U/L (ref 16–63)
ANION GAP SERPL CALC-SCNC: 5 MMOL/L (ref 6–14)
AST SERPL-CCNC: 18 U/L (ref 15–37)
BASOPHILS # BLD AUTO: 0.1 X10^3/UL (ref 0–0.2)
BASOPHILS NFR BLD: 1 % (ref 0–3)
BILIRUB SERPL-MCNC: 0.1 MG/DL (ref 0.2–1)
BUN SERPL-MCNC: 8 MG/DL (ref 8–26)
BUN/CREAT SERPL: 8 (ref 6–20)
CALCIUM SERPL-MCNC: 10.2 MG/DL (ref 8.5–10.1)
CHLORIDE SERPL-SCNC: 105 MMOL/L (ref 98–107)
CO2 SERPL-SCNC: 30 MMOL/L (ref 21–32)
CREAT SERPL-MCNC: 1 MG/DL (ref 0.7–1.3)
EOSINOPHIL NFR BLD: 0.5 X10^3/UL (ref 0–0.7)
EOSINOPHIL NFR BLD: 5 % (ref 0–3)
ERYTHROCYTE [DISTWIDTH] IN BLOOD BY AUTOMATED COUNT: 15.3 % (ref 11.5–14.5)
GFR SERPLBLD BASED ON 1.73 SQ M-ARVRAT: 72.5 ML/MIN
GLUCOSE SERPL-MCNC: 157 MG/DL (ref 70–99)
HCT VFR BLD CALC: 37.8 % (ref 39–53)
HGB BLD-MCNC: 12 G/DL (ref 13–17.5)
LYMPHOCYTES # BLD: 1.4 X10^3/UL (ref 1–4.8)
LYMPHOCYTES NFR BLD AUTO: 15 % (ref 24–48)
MCH RBC QN AUTO: 26 PG (ref 25–35)
MCHC RBC AUTO-ENTMCNC: 32 G/DL (ref 31–37)
MCV RBC AUTO: 81 FL (ref 79–100)
MONO #: 0.9 X10^3/UL (ref 0–1.1)
MONOCYTES NFR BLD: 10 % (ref 0–9)
NEUT #: 6.5 X10^3/UL (ref 1.8–7.7)
NEUTROPHILS NFR BLD AUTO: 69 % (ref 31–73)
PLATELET # BLD AUTO: 443 X10^3/UL (ref 140–400)
POTASSIUM SERPL-SCNC: 5.1 MMOL/L (ref 3.5–5.1)
PROT SERPL-MCNC: 7.1 G/DL (ref 6.4–8.2)
RBC # BLD AUTO: 4.68 X10^6/UL (ref 4.3–5.7)
SODIUM SERPL-SCNC: 140 MMOL/L (ref 136–145)
WBC # BLD AUTO: 9.4 X10^3/UL (ref 4–11)

## 2021-08-23 PROCEDURE — 85025 COMPLETE CBC W/AUTO DIFF WBC: CPT

## 2021-08-23 PROCEDURE — 36415 COLL VENOUS BLD VENIPUNCTURE: CPT

## 2021-08-23 PROCEDURE — 80053 COMPREHEN METABOLIC PANEL: CPT

## 2021-08-30 ENCOUNTER — HOSPITAL ENCOUNTER (OUTPATIENT)
Dept: HOSPITAL 63 - LAB | Age: 76
End: 2021-08-30
Attending: FAMILY MEDICINE
Payer: MEDICARE

## 2021-08-30 DIAGNOSIS — D62: Primary | ICD-10-CM

## 2021-08-30 LAB
BASOPHILS # BLD AUTO: 0.1 X10^3/UL (ref 0–0.2)
BASOPHILS NFR BLD: 1 % (ref 0–3)
EOSINOPHIL NFR BLD: 0.3 X10^3/UL (ref 0–0.7)
EOSINOPHIL NFR BLD: 3 % (ref 0–3)
ERYTHROCYTE [DISTWIDTH] IN BLOOD BY AUTOMATED COUNT: 15.6 % (ref 11.5–14.5)
HCT VFR BLD CALC: 37.6 % (ref 39–53)
HGB BLD-MCNC: 11.9 G/DL (ref 13–17.5)
LYMPHOCYTES # BLD: 1.8 X10^3/UL (ref 1–4.8)
LYMPHOCYTES NFR BLD AUTO: 16 % (ref 24–48)
MCH RBC QN AUTO: 26 PG (ref 25–35)
MCHC RBC AUTO-ENTMCNC: 32 G/DL (ref 31–37)
MCV RBC AUTO: 81 FL (ref 79–100)
MONO #: 1.3 X10^3/UL (ref 0–1.1)
MONOCYTES NFR BLD: 12 % (ref 0–9)
NEUT #: 7.9 X10^3UL (ref 1.8–7.7)
NEUTROPHILS NFR BLD AUTO: 69 % (ref 31–73)
PLATELET # BLD AUTO: 339 X10^3/UL (ref 140–400)
RBC # BLD AUTO: 4.64 X10^6/UL (ref 4.3–5.7)
WBC # BLD AUTO: 11.5 X10^3/UL (ref 4–11)

## 2021-08-30 PROCEDURE — 85025 COMPLETE CBC W/AUTO DIFF WBC: CPT

## 2021-08-30 PROCEDURE — 85730 THROMBOPLASTIN TIME PARTIAL: CPT

## 2021-08-30 PROCEDURE — 36415 COLL VENOUS BLD VENIPUNCTURE: CPT

## 2021-08-30 PROCEDURE — 85610 PROTHROMBIN TIME: CPT

## 2021-09-03 ENCOUNTER — HOSPITAL ENCOUNTER (OUTPATIENT)
Dept: HOSPITAL 61 - MRI | Age: 76
End: 2021-09-03
Attending: INTERNAL MEDICINE
Payer: MEDICARE

## 2021-09-03 DIAGNOSIS — R91.1: ICD-10-CM

## 2021-09-03 DIAGNOSIS — J90: ICD-10-CM

## 2021-09-03 DIAGNOSIS — I70.0: ICD-10-CM

## 2021-09-03 DIAGNOSIS — I73.9: ICD-10-CM

## 2021-09-03 DIAGNOSIS — C34.00: Primary | ICD-10-CM

## 2021-09-03 DIAGNOSIS — H16.429: ICD-10-CM

## 2021-09-03 PROCEDURE — 70553 MRI BRAIN STEM W/O & W/DYE: CPT

## 2021-09-03 PROCEDURE — 78815 PET IMAGE W/CT SKULL-THIGH: CPT

## 2021-09-03 PROCEDURE — A9552 F18 FDG: HCPCS

## 2021-09-03 NOTE — RAD
CLINICAL HISTORY: Left lower lobe Lung cancer, initial evaluation



COMPARISON: CT 8/17/2021, 8/14/2021



TECHNIQUE: 

Location of scan: [facility]



Radiopharmaceutical Dose:  15.03 mCi F-18 FDG intravenous

Blood glucose at time of study: 107

FDG uptake time = 60 minutes.



Images were obtained from the mid head to the mid thighs. 

A low dose, noncontrast CT study was performed for the purpose of attenuation correction and anatomic
 localization.



FINDINGS:

Head and Neck:

Physiologic activity is seen within the head and neck



Chest: 

Nodular opacity in the medial left lower lobe measures approximately 4.8 x 4.6 cm with an SUV max of 
20.2.



Nodular hypermetabolic foci are seen in the left pleural space which may represent metastatic involve
ment or reactive change given recent pigtail catheter placement SUV max measuring 11.5 caudally. Smal
ler nodular foci are seen throughout the left pleural space, a more focal region laterally left upper
 lobe measures 4 x 1.5 cm with an SUVmax of 7.9. Small left pleural effusion is mildly hypermetabolic
. No definite hypermetabolic mediastinal or hilar lymphadenopathy. No hypermetabolic right lung lesio
n.





Abdomen and Pelvis:

Hypermetabolic portocaval lymph node has an SUVmax of 4.4.

Increased activity within the rectum with an SUV max of 6.1.

Mild uptake within the sigmoid colon with an SUV max of 3.4



Physiologic activity is seen within the renal collecting systems, bowel and solid organs including li
tonya. Aortic calcifications are seen. Otherwise, no definite hypermetabolic lymphadenopathy in the abd
omen or pelvis.



Moderate colonic stool content. No bowel obstruction. Simple appearing left upper pole renal cyst.



Skeletal:

No suspicious hypermetabolic osseous lesion.



Reference SUV Values:

Mediastinal SUV Max:  2.5

Liver SUV Max: 3.5



IMPRESSION:

1.  Hypermetabolic masslike opacity in the left lower lobe is suspicious for primary malignancy and c
an be correlated with biopsy. 

2.  Hypermetabolic pleural nodularity is suspicious for pleural metastatic disease. Mildly hypermetab
olic left pleural effusion is also seen.

3.  Borderline hypermetabolic portacaval lymph node with an SUV max of 4.4 upper lobe although not en
larged by size criteria and therefore may be reactive or metastatic.

4.  Increased uptake within the rectum and mid sigmoid colon, primary malignancy is not excluded and 
recommend correlation with colonoscopy.



Radiation Dosimetry:

The radiopharmaceutical used for this exam delivers approximately 0.7 mSv/mCi (70 mRem/mCi)

Source:  ICRP Publication 106



Electronically signed by: Victor Hugo Dawson MD (9/3/2021 3:49 PM) [id number]

## 2021-09-03 NOTE — RAD
EXAM: Brain MRI with and without contrast.



HISTORY: Cancer staging



TECHNIQUE: Multiplanar, multisequence magnetic resonance imaging of the brain was performed prior to 
and following the administration of intravenous contrast.



COMPARISON: None.



FINDINGS: There is no restricted diffusion to suggest acute or subacute infarction. There is no susce
ptibility effect to suggest hemorrhage. There is no mass effect or midline shift. There is no hydroce
phalus. There is cerebral volume loss. There are extensive scattered areas of signal change throughou
t the cerebral white matter, most commonly due to chronic small vessel disease in patients of this ag
e. There is a suspected chronic infarct within the posterior medial left parietal lobe.



No suspicious enhancing lesion is seen. The orbits are unremarkable. The paranasal sinuses are unrema
rkable. There is minimal mastoid fluid. There are normal flow voids within the cerebral vessels. Ther
e is fusion is rotation throughout the visualized cervical spine, not formally assessed on this exam.
 There is soft tissue pannus surrounding the dens and degenerative change throughout the visualized c
ervical levels.



IMPRESSION: 

1. No acute intracranial finding or evidence of intracranial metastatic disease.

2. Extensive scattered areas of signal change within the cerebral white matter, likely due to chronic
 small vessel disease in a patient of this age.

3. 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

4. Cerebral atrophy.



Electronically signed by: Dhara Donovan MD (9/3/2021 12:56 PM) [id number]

## 2021-09-22 ENCOUNTER — HOSPITAL ENCOUNTER (OUTPATIENT)
Dept: HOSPITAL 61 - INTRAD | Age: 76
Discharge: HOME | End: 2021-09-22
Attending: INTERNAL MEDICINE
Payer: MEDICARE

## 2021-09-22 VITALS — DIASTOLIC BLOOD PRESSURE: 83 MMHG | SYSTOLIC BLOOD PRESSURE: 138 MMHG

## 2021-09-22 VITALS — SYSTOLIC BLOOD PRESSURE: 112 MMHG | DIASTOLIC BLOOD PRESSURE: 70 MMHG

## 2021-09-22 VITALS — DIASTOLIC BLOOD PRESSURE: 73 MMHG | SYSTOLIC BLOOD PRESSURE: 118 MMHG

## 2021-09-22 VITALS — HEIGHT: 68 IN | WEIGHT: 181.22 LBS | BODY MASS INDEX: 27.47 KG/M2

## 2021-09-22 VITALS — DIASTOLIC BLOOD PRESSURE: 81 MMHG | SYSTOLIC BLOOD PRESSURE: 121 MMHG

## 2021-09-22 VITALS — SYSTOLIC BLOOD PRESSURE: 120 MMHG | DIASTOLIC BLOOD PRESSURE: 74 MMHG

## 2021-09-22 VITALS — SYSTOLIC BLOOD PRESSURE: 132 MMHG | DIASTOLIC BLOOD PRESSURE: 67 MMHG

## 2021-09-22 VITALS — DIASTOLIC BLOOD PRESSURE: 72 MMHG | SYSTOLIC BLOOD PRESSURE: 117 MMHG

## 2021-09-22 VITALS — SYSTOLIC BLOOD PRESSURE: 127 MMHG | DIASTOLIC BLOOD PRESSURE: 66 MMHG

## 2021-09-22 VITALS — DIASTOLIC BLOOD PRESSURE: 65 MMHG | SYSTOLIC BLOOD PRESSURE: 118 MMHG

## 2021-09-22 VITALS — SYSTOLIC BLOOD PRESSURE: 127 MMHG | DIASTOLIC BLOOD PRESSURE: 71 MMHG

## 2021-09-22 VITALS — SYSTOLIC BLOOD PRESSURE: 119 MMHG | DIASTOLIC BLOOD PRESSURE: 73 MMHG

## 2021-09-22 DIAGNOSIS — Z98.890: ICD-10-CM

## 2021-09-22 DIAGNOSIS — E78.00: ICD-10-CM

## 2021-09-22 DIAGNOSIS — K21.9: ICD-10-CM

## 2021-09-22 DIAGNOSIS — Z88.8: ICD-10-CM

## 2021-09-22 DIAGNOSIS — R91.8: ICD-10-CM

## 2021-09-22 DIAGNOSIS — Z45.2: Primary | ICD-10-CM

## 2021-09-22 DIAGNOSIS — C34.32: ICD-10-CM

## 2021-09-22 DIAGNOSIS — E11.9: ICD-10-CM

## 2021-09-22 DIAGNOSIS — Z79.899: ICD-10-CM

## 2021-09-22 DIAGNOSIS — Z87.891: ICD-10-CM

## 2021-09-22 DIAGNOSIS — I10: ICD-10-CM

## 2021-09-22 DIAGNOSIS — G47.30: ICD-10-CM

## 2021-09-22 LAB
ANION GAP SERPL CALC-SCNC: 9 MMOL/L (ref 6–14)
BASOPHILS # BLD AUTO: 0.1 X10^3/UL (ref 0–0.2)
BASOPHILS NFR BLD: 1 % (ref 0–3)
BUN SERPL-MCNC: 8 MG/DL (ref 8–26)
CALCIUM SERPL-MCNC: 9.8 MG/DL (ref 8.5–10.1)
CHLORIDE SERPL-SCNC: 105 MMOL/L (ref 98–107)
CO2 SERPL-SCNC: 28 MMOL/L (ref 21–32)
CREAT SERPL-MCNC: 0.8 MG/DL (ref 0.7–1.3)
EOSINOPHIL NFR BLD: 0.8 X10^3/UL (ref 0–0.7)
EOSINOPHIL NFR BLD: 10 % (ref 0–3)
ERYTHROCYTE [DISTWIDTH] IN BLOOD BY AUTOMATED COUNT: 15.9 % (ref 11.5–14.5)
GFR SERPLBLD BASED ON 1.73 SQ M-ARVRAT: 93.7 ML/MIN
GLUCOSE SERPL-MCNC: 91 MG/DL (ref 70–99)
HCT VFR BLD CALC: 37.8 % (ref 39–53)
HGB BLD-MCNC: 12.3 G/DL (ref 13–17.5)
LYMPHOCYTES # BLD: 1.5 X10^3/UL (ref 1–4.8)
LYMPHOCYTES NFR BLD AUTO: 17 % (ref 24–48)
MCH RBC QN AUTO: 26 PG (ref 25–35)
MCHC RBC AUTO-ENTMCNC: 32 G/DL (ref 31–37)
MCV RBC AUTO: 80 FL (ref 79–100)
MONO #: 0.8 X10^3/UL (ref 0–1.1)
MONOCYTES NFR BLD: 9 % (ref 0–9)
NEUT #: 5.5 X10^3/UL (ref 1.8–7.7)
NEUTROPHILS NFR BLD AUTO: 63 % (ref 31–73)
PLATELET # BLD AUTO: 263 X10^3/UL (ref 140–400)
POTASSIUM SERPL-SCNC: 4 MMOL/L (ref 3.5–5.1)
PROTHROMBIN TIME: 13.2 SEC (ref 11.7–14)
RBC # BLD AUTO: 4.73 X10^6/UL (ref 4.3–5.7)
SODIUM SERPL-SCNC: 142 MMOL/L (ref 136–145)
WBC # BLD AUTO: 8.7 X10^3/UL (ref 4–11)

## 2021-09-22 PROCEDURE — 76937 US GUIDE VASCULAR ACCESS: CPT

## 2021-09-22 PROCEDURE — 80048 BASIC METABOLIC PNL TOTAL CA: CPT

## 2021-09-22 PROCEDURE — C1892 INTRO/SHEATH,FIXED,PEEL-AWAY: HCPCS

## 2021-09-22 PROCEDURE — 36561 INSERT TUNNELED CV CATH: CPT

## 2021-09-22 PROCEDURE — 32408 CORE NDL BX LNG/MED PERQ: CPT

## 2021-09-22 PROCEDURE — 77001 FLUOROGUIDE FOR VEIN DEVICE: CPT

## 2021-09-22 PROCEDURE — 99152 MOD SED SAME PHYS/QHP 5/>YRS: CPT

## 2021-09-22 PROCEDURE — 85025 COMPLETE CBC W/AUTO DIFF WBC: CPT

## 2021-09-22 PROCEDURE — 85610 PROTHROMBIN TIME: CPT

## 2021-09-22 PROCEDURE — 36415 COLL VENOUS BLD VENIPUNCTURE: CPT

## 2021-09-22 PROCEDURE — 71045 X-RAY EXAM CHEST 1 VIEW: CPT

## 2021-09-22 PROCEDURE — 99153 MOD SED SAME PHYS/QHP EA: CPT

## 2021-09-22 NOTE — RAD
Date: 9/22/2021



PROCEDURES:



1. Fluoroscopically and ultrasound-guided placement of right internal jugular tunnel central venous c
atheter with port (Bard PowerPort, Groshong tip ).

2. CT-guided biopsy, left lung mass



Clinical Indication:  Primary lung cancer, left lower lobe. Pleural metastasis. Further tissue sampli
ng needed for pathologic evaluation/PDL testing



Consent: The procedure was explained in its entirety to the patient or the patients designated repres
entative by a member of the treatment team, including a discussion of the risks, benefits and commonl
y accepted alternatives to the procedure, as well as the expected consequences of no therapy whatsoev
er.   Discussion of the risks included, but was not limited to, those that are most frequent and thos
e that are rare but possibly severe or life-threatening, as well as the possibility of unforeseen com
plications.  

 

The patient was brought to the CT scanner and placed in the prone position. A timeout procedure was p
erformed. The posterior chest was prepped and draped using sterile barrier technique. CT imaging was 
obtained redemonstrating a left lower lobe pulmonary mass with multifocal pleural thickening. The dom
inant masses targeted for biopsy. 1% lidocaine was administered for local anesthesia. Under intermitt
ent CT guidance a 17-gauge needle was advanced into the mass and core biopsy samples were obtained. T
he needle was removed. Manual pressure was held. Repeat CT imaging demonstrates no pneumothorax or ot
her immediate complication. Sterile dressings were applied. The patient was transferred to the fluoro
scopy suite in stable condition.



A timeout procedure was repeated. The right neck and chest were prepped and draped using maximum ster
ile barrier technique including the use of: Current guideline approved cutaneous antisepsis, a large 
sterile sheet to establish a sterile field. Additionally the  wore a hat, mask, sterile glove
s, a sterile gown during the procedure as well as practiced acceptable hand hygiene prior to placing 
the port.



Ultrasound evaluation showed the right jugular vein to be patent and compressible. 1 % lidocaine with
 epinephrine was administered to the skin and subcutaneous tissues overlying the right neck and chest
. Under direct ultrasound guidance a single wall puncture was made followed by tract dilation and seun
cement of a sheath. An ultrasound image was saved and sent to PACS. Next, an incision was made in an 
infraclavicular location and a pocket created.  The catheter was tunneled  between the pocket and the
 venotomy site. The catheter was advanced through the peel away sheath,  under fluoroscopic guidance,
 such that it's tip was in the mid right atrium. The catheter was connected to the port reservoir. Th
e port was accessed and found to flush and aspirate normally. The reservoir was then placed into the 
subcutaneous pocket. The wound was closed in layers using   4-0 Vicryl suture. Dermabond was applied 
overlying the wound, and venotomy site. The patient tolerated procedure without immediate complicatio
n.





Sedation: The procedure was performed under conscious sedation including continuous cardiopulmonary m
onitoring via a dedicated sedation nurse.

Face-to-face sedation time: 70 minutes



Fluoroscopy time: 0.4 mins

Dose area product 2 Gray centimeter squared 



Impression: 

1. Successful CT-guided biopsy, left lower lobe pulmonary mass

2. Placement of a right internal jugular port with ultrasound and fluoroscopic guidance

 





CT DOSING PQRS STATEMENT: 

One or more of the following individualized dose reduction techniques were utilized for this examinat
ion: 

 1. Automated exposure control 

 2. Adjustment of the mA and/or kV according to patient size  

3. Use of iterative reconstruction technique



Electronically signed by: Roberto Brooks MD (9/22/2021 12:31 PM) [id number]

## 2021-09-22 NOTE — RAD
Date: 9/22/2021



PROCEDURES:



1. Fluoroscopically and ultrasound-guided placement of right internal jugular tunnel central venous c
atheter with port (Bard PowerPort, Groshong tip ).

2. CT-guided biopsy, left lung mass



Clinical Indication:  Primary lung cancer, left lower lobe. Pleural metastasis. Further tissue sampli
ng needed for pathologic evaluation/PDL testing



Consent: The procedure was explained in its entirety to the patient or the patients designated repres
entative by a member of the treatment team, including a discussion of the risks, benefits and commonl
y accepted alternatives to the procedure, as well as the expected consequences of no therapy whatsoev
er.   Discussion of the risks included, but was not limited to, those that are most frequent and thos
e that are rare but possibly severe or life-threatening, as well as the possibility of unforeseen com
plications.  

 

The patient was brought to the CT scanner and placed in the prone position. A timeout procedure was p
erformed. The posterior chest was prepped and draped using sterile barrier technique. CT imaging was 
obtained redemonstrating a left lower lobe pulmonary mass with multifocal pleural thickening. The dom
inant masses targeted for biopsy. 1% lidocaine was administered for local anesthesia. Under intermitt
ent CT guidance a 17-gauge needle was advanced into the mass and core biopsy samples were obtained. T
he needle was removed. Manual pressure was held. Repeat CT imaging demonstrates no pneumothorax or ot
her immediate complication. Sterile dressings were applied. The patient was transferred to the fluoro
scopy suite in stable condition.



A timeout procedure was repeated. The right neck and chest were prepped and draped using maximum ster
ile barrier technique including the use of: Current guideline approved cutaneous antisepsis, a large 
sterile sheet to establish a sterile field. Additionally the  wore a hat, mask, sterile glove
s, a sterile gown during the procedure as well as practiced acceptable hand hygiene prior to placing 
the port.



Ultrasound evaluation showed the right jugular vein to be patent and compressible. 1 % lidocaine with
 epinephrine was administered to the skin and subcutaneous tissues overlying the right neck and chest
. Under direct ultrasound guidance a single wall puncture was made followed by tract dilation and seun
cement of a sheath. An ultrasound image was saved and sent to PACS. Next, an incision was made in an 
infraclavicular location and a pocket created.  The catheter was tunneled  between the pocket and the
 venotomy site. The catheter was advanced through the peel away sheath,  under fluoroscopic guidance,
 such that it's tip was in the mid right atrium. The catheter was connected to the port reservoir. Th
e port was accessed and found to flush and aspirate normally. The reservoir was then placed into the 
subcutaneous pocket. The wound was closed in layers using   4-0 Vicryl suture. Dermabond was applied 
overlying the wound, and venotomy site. The patient tolerated procedure without immediate complicatio
n.





Sedation: The procedure was performed under conscious sedation including continuous cardiopulmonary m
onitoring via a dedicated sedation nurse.

Face-to-face sedation time: 70 minutes



Fluoroscopy time: 0.4 mins

Dose area product 2 Gray centimeter squared 



Impression: 

1. Successful CT-guided biopsy, left lower lobe pulmonary mass

2. Placement of a right internal jugular port with ultrasound and fluoroscopic guidance

 





CT DOSING PQRS STATEMENT: 

One or more of the following individualized dose reduction techniques were utilized for this examinat
ion: 

 1. Automated exposure control 

 2. Adjustment of the mA and/or kV according to patient size  

3. Use of iterative reconstruction technique



Electronically signed by: Roberto Brooks MD (9/22/2021 12:31 PM) [id number]

## 2021-09-22 NOTE — NUR
Discharge Note:



NAYELY BECKER



Discharge instructions and discharge home medications reviewed with Patient and a copy 
given. All questions have been answered and understanding verbalized. 



The following instructions and handouts were given: Lung biopsy, Port Placement, and 
moderate sedation. 



Discontinued lines and drains: Right wrist iv dc'd, tip intact, bandage applied.



Patient discharged to home with wife via personal vehicle.

## 2021-09-22 NOTE — RAD
Date: 9/22/2021



PROCEDURES:



1. Fluoroscopically and ultrasound-guided placement of right internal jugular tunnel central venous c
atheter with port (Bard PowerPort, Groshong tip ).

2. CT-guided biopsy, left lung mass



Clinical Indication:  Primary lung cancer, left lower lobe. Pleural metastasis. Further tissue sampli
ng needed for pathologic evaluation/PDL testing



Consent: The procedure was explained in its entirety to the patient or the patients designated repres
entative by a member of the treatment team, including a discussion of the risks, benefits and commonl
y accepted alternatives to the procedure, as well as the expected consequences of no therapy whatsoev
er.   Discussion of the risks included, but was not limited to, those that are most frequent and thos
e that are rare but possibly severe or life-threatening, as well as the possibility of unforeseen com
plications.  

 

The patient was brought to the CT scanner and placed in the prone position. A timeout procedure was p
erformed. The posterior chest was prepped and draped using sterile barrier technique. CT imaging was 
obtained redemonstrating a left lower lobe pulmonary mass with multifocal pleural thickening. The dom
inant masses targeted for biopsy. 1% lidocaine was administered for local anesthesia. Under intermitt
ent CT guidance a 17-gauge needle was advanced into the mass and core biopsy samples were obtained. T
he needle was removed. Manual pressure was held. Repeat CT imaging demonstrates no pneumothorax or ot
her immediate complication. Sterile dressings were applied. The patient was transferred to the fluoro
scopy suite in stable condition.



A timeout procedure was repeated. The right neck and chest were prepped and draped using maximum ster
ile barrier technique including the use of: Current guideline approved cutaneous antisepsis, a large 
sterile sheet to establish a sterile field. Additionally the  wore a hat, mask, sterile glove
s, a sterile gown during the procedure as well as practiced acceptable hand hygiene prior to placing 
the port.



Ultrasound evaluation showed the right jugular vein to be patent and compressible. 1 % lidocaine with
 epinephrine was administered to the skin and subcutaneous tissues overlying the right neck and chest
. Under direct ultrasound guidance a single wall puncture was made followed by tract dilation and seun
cement of a sheath. An ultrasound image was saved and sent to PACS. Next, an incision was made in an 
infraclavicular location and a pocket created.  The catheter was tunneled  between the pocket and the
 venotomy site. The catheter was advanced through the peel away sheath,  under fluoroscopic guidance,
 such that it's tip was in the mid right atrium. The catheter was connected to the port reservoir. Th
e port was accessed and found to flush and aspirate normally. The reservoir was then placed into the 
subcutaneous pocket. The wound was closed in layers using   4-0 Vicryl suture. Dermabond was applied 
overlying the wound, and venotomy site. The patient tolerated procedure without immediate complicatio
n.





Sedation: The procedure was performed under conscious sedation including continuous cardiopulmonary m
onitoring via a dedicated sedation nurse.

Face-to-face sedation time: 70 minutes



Fluoroscopy time: 0.4 mins

Dose area product 2 Gray centimeter squared 



Impression: 

1. Successful CT-guided biopsy, left lower lobe pulmonary mass

2. Placement of a right internal jugular port with ultrasound and fluoroscopic guidance

 





CT DOSING PQRS STATEMENT: 

One or more of the following individualized dose reduction techniques were utilized for this examinat
ion: 

 1. Automated exposure control 

 2. Adjustment of the mA and/or kV according to patient size  

3. Use of iterative reconstruction technique



Electronically signed by: Roberto Brooks MD (9/22/2021 12:31 PM) [id number]

## 2021-09-22 NOTE — RAD
XR CHEST 1V



CLINICAL INDICATIONS: Reason: Post biopsy / Spl. Instructions:  / History:  



COMPARISON: August 19, 2021.



Findings: There is mild elevation of the left hemidiaphragm. Small left-sided pleural effusion and as
sociated left lung base atelectasis or infiltrate is evident and there is mild improvement. Right chris
g field remains clear. No pneumothorax is seen. Heart size and mediastinum and pulmonary vasculature 
and both beth are unremarkable. Right IJ central line is in place and the tip is seen within the mid 
superior vena cava above the level of the right atrium. 



IMPRESSION: Mild improvement in small left-sided pleural effusion and associated left lung base atele
ctasis or infiltrate. Placement of right IJ central line. No pneumothorax.



Electronically signed by: Elver Eckert MD (9/22/2021 5:19 PM) [id number]

## 2021-09-22 NOTE — RAD
Date: 9/22/2021



PROCEDURES:



1. Fluoroscopically and ultrasound-guided placement of right internal jugular tunnel central venous c
atheter with port (Bard PowerPort, Groshong tip ).

2. CT-guided biopsy, left lung mass



Clinical Indication:  Primary lung cancer, left lower lobe. Pleural metastasis. Further tissue sampli
ng needed for pathologic evaluation/PDL testing



Consent: The procedure was explained in its entirety to the patient or the patients designated repres
entative by a member of the treatment team, including a discussion of the risks, benefits and commonl
y accepted alternatives to the procedure, as well as the expected consequences of no therapy whatsoev
er.   Discussion of the risks included, but was not limited to, those that are most frequent and thos
e that are rare but possibly severe or life-threatening, as well as the possibility of unforeseen com
plications.  

 

The patient was brought to the CT scanner and placed in the prone position. A timeout procedure was p
erformed. The posterior chest was prepped and draped using sterile barrier technique. CT imaging was 
obtained redemonstrating a left lower lobe pulmonary mass with multifocal pleural thickening. The dom
inant masses targeted for biopsy. 1% lidocaine was administered for local anesthesia. Under intermitt
ent CT guidance a 17-gauge needle was advanced into the mass and core biopsy samples were obtained. T
he needle was removed. Manual pressure was held. Repeat CT imaging demonstrates no pneumothorax or ot
her immediate complication. Sterile dressings were applied. The patient was transferred to the fluoro
scopy suite in stable condition.



A timeout procedure was repeated. The right neck and chest were prepped and draped using maximum ster
ile barrier technique including the use of: Current guideline approved cutaneous antisepsis, a large 
sterile sheet to establish a sterile field. Additionally the  wore a hat, mask, sterile glove
s, a sterile gown during the procedure as well as practiced acceptable hand hygiene prior to placing 
the port.



Ultrasound evaluation showed the right jugular vein to be patent and compressible. 1 % lidocaine with
 epinephrine was administered to the skin and subcutaneous tissues overlying the right neck and chest
. Under direct ultrasound guidance a single wall puncture was made followed by tract dilation and seun
cement of a sheath. An ultrasound image was saved and sent to PACS. Next, an incision was made in an 
infraclavicular location and a pocket created.  The catheter was tunneled  between the pocket and the
 venotomy site. The catheter was advanced through the peel away sheath,  under fluoroscopic guidance,
 such that it's tip was in the mid right atrium. The catheter was connected to the port reservoir. Th
e port was accessed and found to flush and aspirate normally. The reservoir was then placed into the 
subcutaneous pocket. The wound was closed in layers using   4-0 Vicryl suture. Dermabond was applied 
overlying the wound, and venotomy site. The patient tolerated procedure without immediate complicatio
n.





Sedation: The procedure was performed under conscious sedation including continuous cardiopulmonary m
onitoring via a dedicated sedation nurse.

Face-to-face sedation time: 70 minutes



Fluoroscopy time: 0.4 mins

Dose area product 2 Gray centimeter squared 



Impression: 

1. Successful CT-guided biopsy, left lower lobe pulmonary mass

2. Placement of a right internal jugular port with ultrasound and fluoroscopic guidance

 





CT DOSING PQRS STATEMENT: 

One or more of the following individualized dose reduction techniques were utilized for this examinat
ion: 

 1. Automated exposure control 

 2. Adjustment of the mA and/or kV according to patient size  

3. Use of iterative reconstruction technique



Electronically signed by: Roberto Brooks MD (9/22/2021 12:31 PM) [id number]

## 2021-09-24 NOTE — PATHOLOGY
[facility] Accession Number: [id number]

.                                                                01

Material submitted:                                        .

lung - LEFT LUNG MASS BIOPSY. Modifiers: left

.                                                                01

Clinical history:                                          .

L LUNG CA

.                                                                02

**********************************************************************

Diagnosis:

Left lung mass, CT guided needle biopsies:

- ADENOCARCINOMA, MODERATELY DIFFERENTIATED.  SEE COMMENT.

(JPM:shasha; 09/23/2021)

S  09/24/2021  0845 Local

**********************************************************************

.                                                                02

Comment:

Sections of the left lung mass needle biopsy show replacement of lung

parenchyma by a malignant epithelial neoplasm.  The latter is composed of

irregular acinar structures which infiltrate a reactive fibrotic stroma.

The tumor also focally has a papillary configuration.  The tumor cells

have modest amounts of eosinophilic cytoplasm, and possess enlarged,

rounded to ovoid moderately pleomorphic hyperchromatic nuclei.  There are

mitotic figures present.  A properly controlled panel of immunoperoxidase

stains is obtained on A1 and yields the following results:

.

Cytokeratin 7:  Tumor cells positive.

Cytokeratin 20: Tumor cells negative.

CDX2: Tumor cells negative.

TTF-1:  Tumor cells positive.

Napsin A:  Tumor cells focally positive.

.

The morphologic and immunophenotypic findings are supportive of the

diagnosis of a moderately differentiated pulmonary acinar adenocarcinoma.

The results were discussed with Dr. Brooks on 09/23/2021.  The case is

also examined by Dr. Montes De Oca, who concurs with the diagnosis.

(JPM:shasha; 09/23/2021)

.

.

Special stains performed:  Immunoperoxidase stains for CK7, CK20, CDX2,

TTF-1 and napsin A on A1

.                                                                02

Electronically signed:                                     .

Micheal Bran MD, Pathologist

NPI- [id number]

.                                                                01

Gross description:                                         .

The specimen is received in formalin, labeled "Fantasma Zheng, left lung

mass".  Received are two needle cores of pale tan tissue measuring 0.5 and

1.2 cm in length, with each measuring 0.1 cm in diameter.  The specimen is

submitted entirely in cassettes A1 and A2.

(CAA; 9/22/2021)

QAC/QAC  09/22/2021  1516 Local

.                                                                02

Pathologist provided ICD-10:

C34.92

.                                                                02

CPT                                                        .

503989, [id number], [id number]

Specimen Comment: A courtesy copy of this report has been sent to [phone], [phone]

Specimen Comment: [id number], [phone]

Specimen Comment: Report sent to , DR ENRIQUE / DR ROSALES

***Performed at:  01

   [facility]

   [address], [place]  [id number]

   MD Remy Montes De Oca MD Phone:  [phone]

***Performed at:  02

   [facility]

   [address], [place]  [id number]

   MD Micheal Bran MD Phone:  [phone]

## 2021-10-25 ENCOUNTER — HOSPITAL ENCOUNTER (OUTPATIENT)
Dept: HOSPITAL 63 - US | Age: 76
End: 2021-10-25
Attending: FAMILY MEDICINE
Payer: MEDICARE

## 2021-10-25 DIAGNOSIS — M79.601: Primary | ICD-10-CM

## 2021-10-25 PROCEDURE — 93971 EXTREMITY STUDY: CPT

## 2021-10-25 NOTE — RAD
EXAMINATION: US DPLX VENOUS EXTREMITY UPPER RT (UPPER EXTREMITY VENOUS ULTRASOUND)



CLINICAL HISTORY: Right upper extremity pain



TECHNIQUE: Sonographic grayscale images obtained of the right upper extremity deep venous system with
 color flow Doppler, compression, and augmentation techniques as indicated.  Images obtained and stor
ed in a permanent archive.



COMPARISON:  None





FINDINGS:  



No evidence of absent flow or incompressibility within the internal jugular, subclavian, axillary, an
d brachial veins. Visualized radial and ulnar veins appear patent on limited evaluation.



No evidence of absent flow or incompressibility within the superficial basilic and cephalic veins.



                                                  

IMPRESSION:  



No evidence of right upper extremity DVT.



Electronically signed by: Robb Shin DO (10/25/2021 5:09 PM) [id number]

## 2021-12-02 ENCOUNTER — HOSPITAL ENCOUNTER (OUTPATIENT)
Dept: HOSPITAL 63 - SURG | Age: 76
Discharge: HOME | End: 2021-12-02
Attending: ANESTHESIOLOGY
Payer: MEDICARE

## 2021-12-02 VITALS — DIASTOLIC BLOOD PRESSURE: 66 MMHG | SYSTOLIC BLOOD PRESSURE: 127 MMHG

## 2021-12-02 DIAGNOSIS — I11.0: ICD-10-CM

## 2021-12-02 DIAGNOSIS — I25.10: ICD-10-CM

## 2021-12-02 DIAGNOSIS — M19.90: ICD-10-CM

## 2021-12-02 DIAGNOSIS — I50.9: ICD-10-CM

## 2021-12-02 DIAGNOSIS — M54.2: Primary | ICD-10-CM

## 2021-12-02 DIAGNOSIS — E78.00: ICD-10-CM

## 2021-12-02 DIAGNOSIS — Z80.3: ICD-10-CM

## 2021-12-02 DIAGNOSIS — Z98.890: ICD-10-CM

## 2021-12-02 DIAGNOSIS — Z79.899: ICD-10-CM

## 2021-12-02 DIAGNOSIS — M54.12: ICD-10-CM

## 2021-12-02 PROCEDURE — 99204 OFFICE O/P NEW MOD 45 MIN: CPT

## 2021-12-02 PROCEDURE — G0463 HOSPITAL OUTPT CLINIC VISIT: HCPCS

## 2022-01-05 ENCOUNTER — HOSPITAL ENCOUNTER (OUTPATIENT)
Dept: HOSPITAL 63 - SURG | Age: 77
Discharge: HOME | End: 2022-01-05
Attending: ANESTHESIOLOGY
Payer: MEDICARE

## 2022-01-05 VITALS — DIASTOLIC BLOOD PRESSURE: 71 MMHG | SYSTOLIC BLOOD PRESSURE: 127 MMHG

## 2022-01-05 DIAGNOSIS — M54.2: Primary | ICD-10-CM

## 2022-01-05 DIAGNOSIS — Z98.890: ICD-10-CM

## 2022-01-05 DIAGNOSIS — M54.12: ICD-10-CM

## 2022-01-05 DIAGNOSIS — Z79.899: ICD-10-CM

## 2022-01-05 DIAGNOSIS — I10: ICD-10-CM

## 2022-01-05 DIAGNOSIS — Z88.8: ICD-10-CM

## 2022-01-05 PROCEDURE — G0463 HOSPITAL OUTPT CLINIC VISIT: HCPCS

## 2022-01-05 PROCEDURE — 99214 OFFICE O/P EST MOD 30 MIN: CPT

## 2022-01-14 ENCOUNTER — HOSPITAL ENCOUNTER (EMERGENCY)
Dept: HOSPITAL 63 - ER | Age: 77
Discharge: HOME | End: 2022-01-14
Payer: MEDICARE

## 2022-01-14 VITALS — DIASTOLIC BLOOD PRESSURE: 84 MMHG | SYSTOLIC BLOOD PRESSURE: 130 MMHG

## 2022-01-14 VITALS — HEIGHT: 68 IN | BODY MASS INDEX: 28.8 KG/M2 | WEIGHT: 190.04 LBS
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
## 2022-01-14 DIAGNOSIS — S00.81XA: Primary | ICD-10-CM
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
## 2022-01-14 DIAGNOSIS — Z88.8: ICD-10-CM
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
## 2022-01-14 PROCEDURE — 70450 CT HEAD/BRAIN W/O DYE: CPT

## 2022-01-14 PROCEDURE — 72125 CT NECK SPINE W/O DYE: CPT

## 2022-01-14 PROCEDURE — 99284 EMERGENCY DEPT VISIT MOD MDM: CPT

## 2022-01-14 NOTE — PHYS DOC
Past History


Additional Past Medical Histor:  Lung Cancer


 (NEGRA BOYER)


Past Surgical History:  Cervical Fusion, Tonsillectomy


 (NEGRA BOYER)


Alcohol Use:  None


 (NEGRA BOYER)





General Adult


EDM:


Chief Complaint:  ABRASION





HPI:


HPI:


Patient is a 77-year-old male who presents to the emergency department following

a fall via EMS.  Patient reports that he was standing outside when he tripped 

and fell hitting the back of his head on his wife's fender of her vehicle.  He 

denies loss of consciousness.  He denies any current pain.  He is reporting an 

abrasion to the back of his head.  He denies any neck or back pain, nausea, 

vomiting, blood thinner use.  He states that his tetanus is up-to-date.  Patient

presents to the emergency department in a cervical collar.  He reports that he 

had a disc fusion in his cervical spine by Dr. Hadley at [facility] on

December 24.


 (NEGRA BOYER)





Review of Systems:


Review of Systems:


Constitutional:  negative unless reported in HPI


Eyes:  negative unless reported in HPI


HENT:  negative unless reported in HPI


Respiratory:  negative unless reported in HPI


Cardiovascular:  negative unless reported in HPI


GI:  negative unless reported in HPI


: negative unless reported in HPI


Musculoskeletal: negative unless reported in HPI


Integument:  negative unless reported in HPI


Neurologic:  negative unless reported in HPI


Endocrine: negative unless reported in HPI


Lymphatic:  negative unless reported in HPI


Psychiatric:  negative unless reported in HPI


 (NEGRA BOYER)





Allergies:


Allergies:





Allergies








Coded Allergies Type Severity Reaction Last Updated Verified


 


  adhesive tape Allergy Unknown  1/5/22 Yes








 (NEGRA BOYER)





Physical Exam:


PE:





Constitutional: Well developed, well nourished, no acute distress, non-toxic 

appearance. []


HENT: Normocephalic, abrasion noted to posterior aspect of head with active 

bleeding, bilateral external ears normal, oropharynx moist, no oral exudates, 

nose normal. []


Eyes: PERRL, EOMI, conjunctiva normal, no discharge. [] 


Neck: Normal range of motion, no tenderness, neck in cervical collar, supple, no

 stridor. [] 


Cardiovascular:Heart rate regular rhythm, no murmur []


Lungs & Thorax:  Bilateral breath sounds clear to auscultation []


Abdomen: Bowel sounds normal, soft, no tenderness, no masses, no pulsatile 

masses. [] 


Skin: Warm, dry, no erythema, no rash. [] 


Back: No tenderness, normal range of motion


Extremities: No tenderness, no cyanosis, no clubbing, ROM intact, no edema, no 

pain with palpation of major joints in upper and lower extremities. [] 


Neurologic: Alert and oriented X 3, normal motor function, normal sensory 

function, no focal deficits noted. []


Psychologic: Affect normal, judgement normal, mood normal. []


 (NEGRA BOYER)





Current Patient Data:


Vital Signs:





                                   Vital Signs








  Date Time  Temp Pulse Resp B/P (MAP) Pulse Ox O2 Delivery O2 Flow Rate FiO2


 


1/14/22 13:35 97.6 102 16 122/85 (97) 96   








 (NEGRA BOYER)





EKG:


EKG:


[]


 (NEGRA BOYER)





Radiology/Procedures:


Radiology/Procedures:


[]REASON: fall


PROCEDURE: CT HEAD AND CERVICAL SPINE WO





EXAMINATION: CT head and cervical spine without IV contrast.





INDICATION:77 years, Male, fall.





COMPARISON: None





TECHNIQUE: Spiral acquisition of contiguous images from the skull base to the 

vertex were obtained. CT of the cervical spine was obtained using contiguous 

spiral imaging from the skull base to the upper thoracic level. Sagittal and 

coronal 2D reformatted series were provided by the technologist. Soft tissue and

 bone window algorithms were reviewed. 





Exposure: One or more of the following individualized dose reduction techniques 

were utilized for this examination:  


1. Automated exposure control  


2. Adjustment of the mA and/or kV according to patient size  


3. Use of iterative reconstruction technique.





FINDINGS:


CT HEAD:


Moderate brain parenchymal volume loss. Supratentorial periventricular white 

matter hypodensities, indeterminate but most likely representing chronic 

microangiopathic disease. Neither mass, midline shift, intracranial hemorrhage, 

acute/subacute ischemic changes, nor extraaxial fluid collections are seen.  The

 paranasal sinuses, mastoid air cells, and middle ears are clear. The orbital 

contents appear within normal limits. Small midline occipital scalp hematoma.





CT CERVICAL SPINE:


Laminectomy changes of C1-C4. Posterior hardware fusion of C1-C5. Anterior 

hardware fusion of C4, C5 and C6. Osseous fusion of the C4-C5 and C5-C6 

vertebrae. Grade 1 anterolisthesis of the 3 over C4. Neither fracture, subluxa

tion, nor traumatic spondylolisthesis is seen. The vertebral body heights are 

preserved. Severe multilevel degenerative changes with disc space narrowing, 

osteophytes, bilateral facet and uncovertebral arthropathy. There is no evidence

 of a large intraspinal hematoma. The prevertebral and paravertebral soft 

tissues are within normal limits. Venous catheter seen in the right internal 

jugular vein.





IMPRESSION:


1.  No acute intracranial abnormality.


2.  Small midline occipital scalp hematoma.


3.  No acute fracture of the cervical spine.





Electronically signed by: Zaid Thomson MD (1/14/2022 2:19 PM) [facility]














DICTATED AND SIGNED BY:     ZAID THOMSON MD


DATE:     01/14/22 1411





CC: MEHDI ROSALES MD; NEGRA BOYER ~MTH0 0


 (NEGRA BOYER)





Heart Score:


C/O Chest Pain:  N/A


Risk Factors:


Risk Factors:  DM, Current or recent (<one month) smoker, HTN, HLP, family 

history of CAD, obesity.


Risk Scores:


Score 0 - 3:  2.5% MACE over next 6 weeks - Discharge Home


Score 4 - 6:  20.3% MACE over next 6 weeks - Admit for Clinical Observation


Score 7 - 10:  72.7% MACE over next 6 weeks - Early Invasive Strategies


 (NEGRA BOYER)





Course & Med Decision Making:


Course & Med Decision Making


Pertinent Labs and Imaging studies reviewed. (See chart for details)





[] Patient presents to the emergency department following a fall.  He has an 

abrasion noted to the posterior aspect of his head is cleansed and a dressing 

was placed, this does not require any sutures as it is a skin tear. educated on 

wound care. Imaging was performed of patient's head and neck that showed no 

acute findings.  Patient's tetanus is up-to-date.  Patient advised to continue 

wearing his c-collar and take Tylenol for his pain.  I discussed with patient 

all findings and diagnostic testing as well as the need to follow-up with PCP 

for further evaluation and treatment or return to the ER if any new or worsening

 symptoms.  Strict return precautions were also discussed at length.  Patient 

voiced understanding and agreement with the plan.  Patient is hemodynamically 

stable at the time of disposition.


 (NEGRA BOYER)





Dragon Disclaimer:


Dragon Disclaimer:


This electronic medical record was generated, in whole or in part, using a voice

 recognition dictation system.


 (NEGRA BOYER)


Attending Co-Sign


The patient was seen and interviewed as well as examined at the bedside. The 

chart was reviewed. The case was discussed. Agree with the plan of care.


 (HEATHER FONTANEZ DO)





Departure


Departure:


Impression:  


   Primary Impression:  


   Fall


   Qualified Codes:  W19.XXXA - Unspecified fall, initial encounter


Disposition:  01 HOME / SELF CARE / HOMELESS


Condition:  GOOD


Referrals:  


MEHDI ROSALES MD (PCP)


Patient Instructions:  Fall Prevention and Home Safety, Head Injury, Adult





Additional Instructions:  


You are seen in the emergency department today for a fall.  Imaging was 

performed of your head and neck which showed no acute findings.  You do have a 

skin tear to the back of your scalp which was cleaned and a dressing was placed.

  Please change your dressing twice a day and when soiled.  Monitor for any 

signs of infection which include redness, warmth, swelling or drainage.  You can

 take Tylenol at home for your pain.  Continue wearing your c-collar as 

previously advised by your surgeon.  I would follow-up with your primary care 

provider tomorrow regarding your ER visit.  Return to the emergency department 

if you develop worsening of your pain, neck or back pain, loss of bowel or 

bladder, numbness or tingling in your groin or down your legs, inability to 

walk, confusion, intractable nausea or vomiting, vision changes, unilateral 

weakness, speech changes or any new or worsening concerns.











NEGRA BOYER          Jan 14, 2022 14:25


HEATHER FONTANEZ DO                 Morro 15, 2022 10:03

## 2022-01-14 NOTE — RAD
EXAMINATION: CT head and cervical spine without IV contrast.



INDICATION:77 years, Male, fall.



COMPARISON: None



TECHNIQUE: Spiral acquisition of contiguous images from the skull base to the vertex were obtained. C
T of the cervical spine was obtained using contiguous spiral imaging from the skull base to the upper
 thoracic level. Sagittal and coronal 2D reformatted series were provided by the technologist. Soft t
issue and bone window algorithms were reviewed. 



Exposure: One or more of the following individualized dose reduction techniques were utilized for thi
s examination:  

1. Automated exposure control  

2. Adjustment of the mA and/or kV according to patient size  

3. Use of iterative reconstruction technique.



FINDINGS:

CT HEAD:

Moderate brain parenchymal volume loss. Supratentorial periventricular white matter hypodensities, in
determinate but most likely representing chronic microangiopathic disease. Neither mass, midline shif
t, intracranial hemorrhage, acute/subacute ischemic changes, nor extraaxial fluid collections are see
n.  The paranasal sinuses, mastoid air cells, and middle ears are clear. The orbital contents appear 
within normal limits. Small midline occipital scalp hematoma.



CT CERVICAL SPINE:

Laminectomy changes of C1-C4. Posterior hardware fusion of C1-C5. Anterior hardware fusion of C4, C5 
and C6. Osseous fusion of the C4-C5 and C5-C6 vertebrae. Grade 1 anterolisthesis of the 3 over C4. Ne
ither fracture, subluxation, nor traumatic spondylolisthesis is seen. The vertebral body heights are 
preserved. Severe multilevel degenerative changes with disc space narrowing, osteophytes, bilateral f
acet and uncovertebral arthropathy. There is no evidence of a large intraspinal hematoma. The prevert
ebral and paravertebral soft tissues are within normal limits. Venous catheter seen in the right inte
rnal jugular vein.



IMPRESSION:

1.  No acute intracranial abnormality.

2.  Small midline occipital scalp hematoma.

3.  No acute fracture of the cervical spine.



Electronically signed by: Aman Thomson MD (1/14/2022 2:19 PM) [facility]

## 2022-03-28 ENCOUNTER — HOSPITAL ENCOUNTER (INPATIENT)
Dept: HOSPITAL 61 - 2 NORTH | Age: 77
LOS: 2 days | Discharge: HOME | DRG: 181 | End: 2022-03-30
Attending: STUDENT IN AN ORGANIZED HEALTH CARE EDUCATION/TRAINING PROGRAM | Admitting: STUDENT IN AN ORGANIZED HEALTH CARE EDUCATION/TRAINING PROGRAM
Payer: MEDICARE

## 2022-03-28 ENCOUNTER — HOSPITAL ENCOUNTER (EMERGENCY)
Dept: HOSPITAL 63 - ER | Age: 77
Discharge: STILL ADMITTED OR ADMINISTRATIVE | End: 2022-03-28
Payer: MEDICARE

## 2022-03-28 VITALS — DIASTOLIC BLOOD PRESSURE: 78 MMHG | SYSTOLIC BLOOD PRESSURE: 113 MMHG

## 2022-03-28 VITALS — SYSTOLIC BLOOD PRESSURE: 121 MMHG | DIASTOLIC BLOOD PRESSURE: 50 MMHG

## 2022-03-28 VITALS — BODY MASS INDEX: 23.72 KG/M2 | WEIGHT: 156.53 LBS | HEIGHT: 68 IN

## 2022-03-28 VITALS — WEIGHT: 168.65 LBS | BODY MASS INDEX: 25.56 KG/M2 | HEIGHT: 68 IN

## 2022-03-28 DIAGNOSIS — M54.50: ICD-10-CM

## 2022-03-28 DIAGNOSIS — J91.0: ICD-10-CM

## 2022-03-28 DIAGNOSIS — H81.10: ICD-10-CM

## 2022-03-28 DIAGNOSIS — C34.92: Primary | ICD-10-CM

## 2022-03-28 DIAGNOSIS — Z82.49: ICD-10-CM

## 2022-03-28 DIAGNOSIS — Z91.041: ICD-10-CM

## 2022-03-28 DIAGNOSIS — Z83.3: ICD-10-CM

## 2022-03-28 DIAGNOSIS — J90: Primary | ICD-10-CM

## 2022-03-28 DIAGNOSIS — Z85.118: ICD-10-CM

## 2022-03-28 DIAGNOSIS — R42: ICD-10-CM

## 2022-03-28 DIAGNOSIS — E78.5: ICD-10-CM

## 2022-03-28 DIAGNOSIS — Z88.8: ICD-10-CM

## 2022-03-28 DIAGNOSIS — K59.00: ICD-10-CM

## 2022-03-28 DIAGNOSIS — I25.10: ICD-10-CM

## 2022-03-28 LAB
ANION GAP SERPL CALC-SCNC: 5 MMOL/L (ref 6–14)
BASOPHILS # BLD AUTO: 0.1 X10^3/UL (ref 0–0.2)
BASOPHILS NFR BLD: 1 % (ref 0–3)
CA-I SERPL ISE-MCNC: 13 MG/DL (ref 8–26)
CALCIUM SERPL-MCNC: 10.4 MG/DL (ref 8.5–10.1)
CHLORIDE SERPL-SCNC: 101 MMOL/L (ref 98–107)
CO2 SERPL-SCNC: 30 MMOL/L (ref 21–32)
CREAT SERPL-MCNC: 0.7 MG/DL (ref 0.7–1.3)
EOSINOPHIL NFR BLD: 1.1 X10^3/UL (ref 0–0.7)
EOSINOPHIL NFR BLD: 10 % (ref 0–3)
ERYTHROCYTE [DISTWIDTH] IN BLOOD BY AUTOMATED COUNT: 15.4 % (ref 11.5–14.5)
GFR SERPLBLD BASED ON 1.73 SQ M-ARVRAT: 109.1 ML/MIN
GLUCOSE SERPL-MCNC: 84 MG/DL (ref 70–99)
HCT VFR BLD CALC: 39.8 % (ref 39–53)
HGB BLD-MCNC: 13 G/DL (ref 13–17.5)
LYMPHOCYTES # BLD: 1.8 X10^3/UL (ref 1–4.8)
LYMPHOCYTES NFR BLD AUTO: 17 % (ref 24–48)
MCH RBC QN AUTO: 28 PG (ref 25–35)
MCHC RBC AUTO-ENTMCNC: 33 G/DL (ref 31–37)
MCV RBC AUTO: 86 FL (ref 79–100)
MONO #: 1.3 X10^3/UL (ref 0–1.1)
MONOCYTES NFR BLD: 12 % (ref 0–9)
NEUT #: 6.4 X10^3UL (ref 1.8–7.7)
NEUTROPHILS NFR BLD AUTO: 60 % (ref 31–73)
PLATELET # BLD AUTO: 266 X10^3/UL (ref 140–400)
POTASSIUM SERPL-SCNC: 5 MMOL/L (ref 3.5–5.1)
RBC # BLD AUTO: 4.64 X10^6/UL (ref 4.3–5.7)
SODIUM SERPL-SCNC: 136 MMOL/L (ref 136–145)
WBC # BLD AUTO: 10.6 X10^3/UL (ref 4–11)

## 2022-03-28 PROCEDURE — 85025 COMPLETE CBC W/AUTO DIFF WBC: CPT

## 2022-03-28 PROCEDURE — 85730 THROMBOPLASTIN TIME PARTIAL: CPT

## 2022-03-28 PROCEDURE — 86140 C-REACTIVE PROTEIN: CPT

## 2022-03-28 PROCEDURE — G0378 HOSPITAL OBSERVATION PER HR: HCPCS

## 2022-03-28 PROCEDURE — 80048 BASIC METABOLIC PNL TOTAL CA: CPT

## 2022-03-28 PROCEDURE — 93005 ELECTROCARDIOGRAM TRACING: CPT

## 2022-03-28 PROCEDURE — 84484 ASSAY OF TROPONIN QUANT: CPT

## 2022-03-28 PROCEDURE — 71045 X-RAY EXAM CHEST 1 VIEW: CPT

## 2022-03-28 PROCEDURE — 85610 PROTHROMBIN TIME: CPT

## 2022-03-28 PROCEDURE — 70450 CT HEAD/BRAIN W/O DYE: CPT

## 2022-03-28 PROCEDURE — 82947 ASSAY GLUCOSE BLOOD QUANT: CPT

## 2022-03-28 PROCEDURE — 99285 EMERGENCY DEPT VISIT HI MDM: CPT

## 2022-03-28 PROCEDURE — 71046 X-RAY EXAM CHEST 2 VIEWS: CPT

## 2022-03-28 PROCEDURE — 71250 CT THORAX DX C-: CPT

## 2022-03-28 PROCEDURE — 36415 COLL VENOUS BLD VENIPUNCTURE: CPT

## 2022-03-28 NOTE — EKG
[facility] [address], [place] [address]

Test Date:    2022               Test Time:    14:07:57

Pat Name:     NAYELY BECKER         Department:   

Patient ID:   [id number]           Room:          

Gender:       M                        Technician:   

:          1945               Requested By: ABILIO BREWSTER

Order Number: [id number]            Reading MD:     

                                 Measurements

Intervals                              Axis          

Rate:         88                       P:            188

SD:           208                      QRS:          -39

QRSD:         80                       T:            17

QT:           334                                    

QTc:          407                                    

                           Interpretive Statements

SINUS RHYTHM

ABNORMAL LEFT AXIS DEVIATION

LEFT ANTERIOR FASCICULAR BLOCK

QRS(T) CONTOUR ABNORMALITY

CONSIDER ANTEROSEPTAL INFARCT

ABNORMAL ECG

RI6.02

No previous ECG available for comparison

## 2022-03-28 NOTE — RAD
EXAMINATION: Chest radiograph.



VIEWS: Single AP view of the chest



COMPARISON: Chest radiograph from 10/29/2021



INDICATION:77 years, Male, dizziness, weakness.



FINDINGS: 

Right chest Port-A-Cath with tip in the SVC. Stable cardiac mediastinal silhouette. Left perihilar an
d basilar opacity appears mildly increased from prior. Small left pleural effusion. Biapical pleural 
scarring. Right lung is clear. No acute osseous process. Cervical spinal fusion instrumentation is ag
ain noted.



IMPRESSION:

Small left pleural effusion with lingular and left lower lobe infiltrate and/or scarring, this appear
s increased from prior.



Electronically signed by: Dameon Gipson DO (3/28/2022 2:50 PM) [id number]

## 2022-03-28 NOTE — PHYS DOC
Past History


Additional Past Medical Histor:  Lung Cancer


Past Surgical History:  Cervical Fusion, Tonsillectomy


Alcohol Use:  None





General Adult


EDM:


Chief Complaint:  DIZZY/LIGHT HEADED





HPI:


HPI:





Patient is a 77-year-old male who arrives ambulatory to the emergency department

complaining of episodic dizziness since yesterday.  Patient reports he noticed 

that when he was moving he became considerably dizzy.  Patient states this has 

been occurring intermittently and the patient states when he is moving around he

feels as if he may fall from time to time.  Patient states he requires support 

when he feels this way as he is fearful of falling.  Patient states when he 

rests his dizziness generally subsides.  Despite this, he denies any history of 

substernal chest pain.  He further denies being short of breath or having any 

recent illness.  Additionally the patient denies the sensation of being 

lightheaded and has not had a change in his level of consciousness.  It is worth

noting the patient does have a history of lung cancer and is currently being 

treated for this.  He is awake, alert and nontoxic-appearing.  He is 

neurologically intact.





Review of Systems:


Review of Systems:


Constitutional:  Denies fever or chills 


Eyes:  Denies change in visual acuity 


HENT:  Denies nasal congestion or sore throat 


Respiratory:  Denies cough or shortness of breath 


Cardiovascular:  Denies chest pain or edema 


GI:  Denies abdominal pain, nausea, vomiting, bloody stools or diarrhea 


: Denies dysuria 


Musculoskeletal:  Denies back pain or joint pain 


Integument:  Denies rash 


Neurologic: Reports dizziness.  Denies headache, focal weakness or sensory 

changes 


Endocrine:  Denies polyuria or polydipsia 


Lymphatic:  Denies swollen glands 


Psychiatric:  Denies depression or anxiety





Allergies:


Allergies:





Allergies








Coded Allergies Type Severity Reaction Last Updated Verified


 


  adhesive tape Allergy Unknown  22 Yes











Physical Exam:


PE:





Constitutional: Well developed, well nourished, no acute distress, non-toxic 

appearance. []


HENT: Normocephalic, atraumatic, bilateral external ears normal, oropharynx 

moist, no oral exudates, nose normal. []


Eyes: PERRLA, EOMI, conjunctiva normal, no discharge. [] 


Neck: Normal range of motion, no tenderness, supple, no stridor. [] 


Cardiovascular:Heart rate regular rhythm, no murmur []


Lungs & Thorax:  Bilateral breath sounds clear to auscultation []


Abdomen: Bowel sounds normal, soft, no tenderness, no masses, no pulsatile 

masses. [] 


Skin: Warm, dry, no erythema, no rash. [] 


Back: No tenderness, no CVA tenderness. [] 


Extremities: No tenderness, no cyanosis, no clubbing, ROM intact, no edema. [] 


Neurologic: Alert and oriented X 3, normal motor function, normal sensory 

function, no focal deficits noted. []


Psychologic: Affect normal, judgement normal, mood normal. []





Current Patient Data:


Vital Signs:





                                   Vital Signs








  Date Time  Temp Pulse Resp B/P (MAP) Pulse Ox O2 Delivery O2 Flow Rate FiO2


 


3/28/22 13:58 98.1 92 20 114/76 (89) 97 Room Air  











EKG:


EKG:


[] EKG was obtained at 1407 hrs. and reveals a sinus rhythm with a ventricular 

rate of 88 bpm.  Intervals are normal.  There is left axis deviation in addition

 to left anterior fascicular block.  There are no acute ST/T wave changes to 

denote ischemia.  There is no STEMI present.





Radiology/Procedures:


Radiology/Procedures:


[][facility] [address], [place] [address]


                                 [phone]


                                        


                                 IMAGING REPORT





                                     Signed





PATIENT: NAYELY BECKER DACCOUNT: [id number]     MRN#: [id number]


: 1945           LOCATION: ER              AGE: 77


SEX: M                    EXAM DT: 22         ACCESSION#: [id number]


STATUS: REG ER            ORD. PHYSICIAN: ABILIO BREWSTER DO


REASON: Dizziness


PROCEDURE: CT CODE STROKE HEAD WO





CT STROKE HEAD W/O





History: Dizziness





Comparison: 2022





Technique: Noncontrast CT imaging was performed of the head.  





Findings: 


No intracranial hemorrhage. No mass effect. No hydrocephalus.  No evidence of 

acute territorial infarction. Hypoattenuation of the periventricular and deep 

white matter consistent with chronic microvascular ischemic changes.





Imaged orbits are unremarkable. Imaged paranasal sinuses and mastoid air cells 

are clear. The scalp and calvarium are unremarkable.





Impression:


1.  No acute intracranial abnormality. 








Findings discussed with ABILIO BREWSTER DO at 3/28/2022 2:23 PM.





**********FOR INTERNAL CODING PURPOSES**********





RESULT CODE: (C)  





  














-----


Exposure: One or more of the following individualized dose reduction techniques 

were utilized for this examination:  


1. Automated exposure control


2. Adjustment of the mA and/or kV according to patient size


3. Use of iterative reconstruction technique.





Electronically signed by: Kelby Queen MD (3/28/2022 2:23 PM) [id number]














DICTATED AND SIGNED BY:     KELBY QUEEN MD


DATE:     22 1421





CC: MEHDI ROSALES MD; ABILIO BREWTSER DO ~


                               [facility] [address], [place] [address]


                                 [phone]


                                        


                                 IMAGING REPORT





                                     Signed





PATIENT: NAYELY BECKER DACCOUNT: [id number]     MRN#: [id number]


: 1945           LOCATION: ER              AGE: 77


SEX: M                    EXAM DT: 22         ACCESSION#: [id number]


STATUS: REG ER            ORD. PHYSICIAN: ABILIO BREWSTER DO


REASON: Dizziness, weakness


PROCEDURE: PORTABLE CHEST 1V





EXAMINATION: Chest radiograph.





VIEWS: Single AP view of the chest





COMPARISON: Chest radiograph from 10/29/2021





INDICATION:77 years, Male, dizziness, weakness.





FINDINGS: 


Right chest Port-A-Cath with tip in the SVC. Stable cardiac mediastinal 

silhouette. Left perihilar and basilar opacity appears mildly increased from 

prior. Small left pleural effusion. Biapical pleural scarring. Right lung is 

clear. No acute osseous process. Cervical spinal fusion instrumentation is again

 noted.





IMPRESSION:


Small left pleural effusion with lingular and left lower lobe infiltrate and/or 

scarring, this appears increased from prior.





Electronically signed by: Alysha Gipson DO (3/28/2022 2:50 PM) [id number]














DICTATED AND SIGNED BY:     ALYSHA GIPSON DO


DATE:     22 1446





CC: MEHDI ROSALES MD; ABILIO BREWSTER DO ~





Heart Score:


C/O Chest Pain:  No


Risk Factors:


Risk Factors:  DM, Current or recent (<one month) smoker, HTN, HLP, family 

history of CAD, obesity.


Risk Scores:


Score 0 - 3:  2.5% MACE over next 6 weeks - Discharge Home


Score 4 - 6:  20.3% MACE over next 6 weeks - Admit for Clinical Observation


Score 7 - 10:  72.7% MACE over next 6 weeks - Early Invasive Strategies





Course & Med Decision Making:


Course & Med Decision Making


Pertinent Labs and Imaging studies reviewed. (See chart for details).





Patient was taken to a room where labs were drawn as well as imaging taken.  CT 

imaging of the brain did not reveal any acute abnormality.  Chest x-ray did 

reveal a sizable left-sided pleural effusion.  As such I do believe the patient 

warrants pleurodesis.  Given this development, the patient will be transferred 

to [facility] for further evaluation where he has been admitted 

to the hospital service.  I do not believe the patient likely had a CVA/TIA 

however I do believe the patient may benefit from an MRI for his given 

condition.  Furthermore the patient states he is not actively taking therapy for

 his lung cancer as he does not want to deal with the adverse effects of 

chemotherapy.  Nonetheless he has agreed to transfer and will be transported not

 emergently wants a bed is made available.  He is nontoxic-appearing resting 

comfortably.  He is stable for transport.





[]





Dragon Disclaimer:


Dragon Disclaimer:


This electronic medical record was generated, in whole or in part, using a voice

 recognition dictation system.





Departure


Departure:


Impression:  


   Primary Impression:  


   Pleural effusion


   Additional Impressions:  


   History of lung cancer


   Dizziness of unknown cause


Disposition:   ADMITTED AS INPATIENT


Admitting Physician:  Other (Dr. Silvestre West)


Condition:  STABLE


Referrals:  


MEHDI ROSALES MD (PCP)











ABILIO BREWSTER DO               Mar 28, 2022 14:10

## 2022-03-28 NOTE — RAD
CT STROKE HEAD W/O



History: Dizziness



Comparison: 1/14/2022



Technique: Noncontrast CT imaging was performed of the head.  



Findings: 

No intracranial hemorrhage. No mass effect. No hydrocephalus.  No evidence of acute territorial infar
ction. Hypoattenuation of the periventricular and deep white matter consistent with chronic microvasc
ular ischemic changes.



Imaged orbits are unremarkable. Imaged paranasal sinuses and mastoid air cells are clear. The scalp a
nd calvarium are unremarkable.



Impression:

1.  No acute intracranial abnormality. 





Findings discussed with ABILIO BREWSTER DO at 3/28/2022 2:23 PM.



**********FOR INTERNAL CODING PURPOSES**********



RESULT CODE: (C)  



  









-----

Exposure: One or more of the following individualized dose reduction techniques were utilized for thi
s examination:  

1. Automated exposure control

2. Adjustment of the mA and/or kV according to patient size

3. Use of iterative reconstruction technique.



Electronically signed by: Kelby Neely MD (3/28/2022 2:23 PM) [id number]

## 2022-03-29 VITALS — SYSTOLIC BLOOD PRESSURE: 126 MMHG | DIASTOLIC BLOOD PRESSURE: 77 MMHG

## 2022-03-29 VITALS — SYSTOLIC BLOOD PRESSURE: 128 MMHG | DIASTOLIC BLOOD PRESSURE: 76 MMHG

## 2022-03-29 VITALS — DIASTOLIC BLOOD PRESSURE: 74 MMHG | SYSTOLIC BLOOD PRESSURE: 133 MMHG

## 2022-03-29 VITALS — DIASTOLIC BLOOD PRESSURE: 63 MMHG | SYSTOLIC BLOOD PRESSURE: 101 MMHG

## 2022-03-29 VITALS — SYSTOLIC BLOOD PRESSURE: 96 MMHG | DIASTOLIC BLOOD PRESSURE: 72 MMHG

## 2022-03-29 VITALS — DIASTOLIC BLOOD PRESSURE: 78 MMHG | SYSTOLIC BLOOD PRESSURE: 117 MMHG

## 2022-03-29 VITALS — SYSTOLIC BLOOD PRESSURE: 152 MMHG | DIASTOLIC BLOOD PRESSURE: 83 MMHG

## 2022-03-29 VITALS — DIASTOLIC BLOOD PRESSURE: 83 MMHG | SYSTOLIC BLOOD PRESSURE: 126 MMHG

## 2022-03-29 VITALS — DIASTOLIC BLOOD PRESSURE: 82 MMHG | SYSTOLIC BLOOD PRESSURE: 135 MMHG

## 2022-03-29 LAB
ANION GAP SERPL CALC-SCNC: 6 MMOL/L (ref 6–14)
BASOPHILS # BLD AUTO: 0.1 X10^3/UL (ref 0–0.2)
BASOPHILS NFR BLD: 1 % (ref 0–3)
BUN SERPL-MCNC: 12 MG/DL (ref 8–26)
CALCIUM SERPL-MCNC: 10.7 MG/DL (ref 8.5–10.1)
CHLORIDE SERPL-SCNC: 104 MMOL/L (ref 98–107)
CO2 SERPL-SCNC: 30 MMOL/L (ref 21–32)
CREAT SERPL-MCNC: 0.7 MG/DL (ref 0.7–1.3)
EOSINOPHIL NFR BLD: 1 X10^3/UL (ref 0–0.7)
EOSINOPHIL NFR BLD: 10 % (ref 0–3)
ERYTHROCYTE [DISTWIDTH] IN BLOOD BY AUTOMATED COUNT: 15.7 % (ref 11.5–14.5)
GFR SERPLBLD BASED ON 1.73 SQ M-ARVRAT: 109.1 ML/MIN
GLUCOSE SERPL-MCNC: 95 MG/DL (ref 70–99)
HCT VFR BLD CALC: 42.8 % (ref 39–53)
HGB BLD-MCNC: 13.7 G/DL (ref 13–17.5)
LYMPHOCYTES # BLD: 1.4 X10^3/UL (ref 1–4.8)
LYMPHOCYTES NFR BLD AUTO: 14 % (ref 24–48)
MCH RBC QN AUTO: 27 PG (ref 25–35)
MCHC RBC AUTO-ENTMCNC: 32 G/DL (ref 31–37)
MCV RBC AUTO: 85 FL (ref 79–100)
MONO #: 1 X10^3/UL (ref 0–1.1)
MONOCYTES NFR BLD: 10 % (ref 0–9)
NEUT #: 6.6 X10^3/UL (ref 1.8–7.7)
NEUTROPHILS NFR BLD AUTO: 65 % (ref 31–73)
PLATELET # BLD AUTO: 257 X10^3/UL (ref 140–400)
POTASSIUM SERPL-SCNC: 4.1 MMOL/L (ref 3.5–5.1)
RBC # BLD AUTO: 5.06 X10^6/UL (ref 4.3–5.7)
SODIUM SERPL-SCNC: 140 MMOL/L (ref 136–145)
WBC # BLD AUTO: 10.2 X10^3/UL (ref 4–11)

## 2022-03-29 RX ADMIN — PSYLLIUM HUSK SCH PKT: 3.4 POWDER ORAL at 12:45

## 2022-03-29 RX ADMIN — HEPARIN SODIUM SCH UNIT: 5000 INJECTION, SOLUTION INTRAVENOUS; SUBCUTANEOUS at 22:14

## 2022-03-29 RX ADMIN — NITROGLYCERIN SCH PATCH: 0.2 PATCH TRANSDERMAL at 12:45

## 2022-03-29 RX ADMIN — PSYLLIUM HUSK SCH PKT: 3.4 POWDER ORAL at 21:52

## 2022-03-29 RX ADMIN — LOSARTAN POTASSIUM SCH MG: 50 TABLET ORAL at 12:45

## 2022-03-29 RX ADMIN — MULTIPLE VITAMINS W/ MINERALS TAB SCH TAB: TAB at 12:44

## 2022-03-29 RX ADMIN — HEPARIN SODIUM SCH UNIT: 5000 INJECTION, SOLUTION INTRAVENOUS; SUBCUTANEOUS at 21:54

## 2022-03-29 RX ADMIN — CYCLOBENZAPRINE HYDROCHLORIDE SCH MG: 10 TABLET, FILM COATED ORAL at 21:52

## 2022-03-29 RX ADMIN — GABAPENTIN SCH MG: 100 CAPSULE ORAL at 12:44

## 2022-03-29 RX ADMIN — DOCUSATE SODIUM SCH MG: 100 CAPSULE, LIQUID FILLED ORAL at 21:52

## 2022-03-29 RX ADMIN — Medication SCH MG: at 12:44

## 2022-03-29 NOTE — PDOC1
History and Physical


Date of Admission


Date of Admission


DATE: 3/29/22 


TIME: 09:31





Identification/Chief Complaint


Chief Complaint


Recurrent left pleural effusion





Source


Source:  Patient





History of Present Illness


History of Present Illness


Patient is a 77-year-old male with past medical history left lung 

adenocarcinoma, who presents to the ED as a transfer [facility] due to concerns 

for recurrent left pleural effusion.  He was initially sent to [facility] ER at 

the behest of his PCP, Dr. Mercedes.  Chest x-ray at [facility] did show a 

small left pleural effusion with lingular and left lower lobe infiltrate and/or 

scarring, that appears increased from prior.  He states he was admitted on 

8/10/2021 and had thoracentesis performed at that time.  He did follow-up with 

oncology, but states he decided not to pursue chemotherapy or radiation for the 

findings of stage IV lung adenocarcinoma.  Patient states he lives at home with 

his wife, and gets around at home rather gingerly.  At the time of my evaluation

he is breathing on room air.  Labs on admission were rather unremarkable.  He 

has been admitted for further medical management.





Past Medical History


Cardiovascular:  CAD, Hyperlipidemia


Pulmonary:  No pertinent hx


Musculoskeletal:   low back pain





Past Surgical History


Past Surgical History


Cervical fusion, thoracentesis


Past Surgical History:  Tonsillectomy





Family History


Family History:  Coronary Artery Disease, Diabetes





Social History


Smoke:  No


ALCOHOL:  none


Drugs:  None





Current Medications


Current Medications





Active Scripts


Active


Reported


Hydroxyzine Hcl 25 Mg Tablet 25 Mg PO QID


NITRO-DUR 0.2mg/hr (Nitroglycerin) 1 Each Patch.td24 1 Each TD DAILY


Ferrous Sulfate 325 Mg Tablet 1 Tab PO DAILY


Gabapentin ** (Gabapentin) 100 Mg Capsule 200 Mg PO DAILY


Tylenol Extra Strength (Acetaminophen) 500 Mg Tablet 2 Tab PO Q8HRS PRN


[probiotic w/vit C]   2 Pkt PO DAILY


Gentamicin Sulfate 0.3% Ophth Soln (Gentamicin Sulfate) 5 Ml Drops 1 Drop 

EACHEYE BID 5 Days


Docusate Sodium 100 Mg Capsule 1 Cap PO BID 7 Days


Metamucil (Psyllium Husk) 0.52 Gm Capsule 0.52 Gm PO BID


Cyclobenzaprine Hcl 10 Mg Tablet 10 Mg PO BID


NITRO-DUR 0.2mg/hr (Nitroglycerin) 1 Each Patch.td24 1 Each TD DAILY


Vitamin D3 (Cholecalciferol (Vitamin D3)) 1,000 Unit Tablet 1 Tab PO DAILY


Losartan Potassium 50 Mg Tablet 50 Mg PO DAILY


Simvastatin 80 Mg Tablet 80 Mg PO HS


Niacin Flush Free 500 Mg Cap (Niacin (Inositol Niacinate)) 400 Mg Capsule 500 Mg

PO BID


Centrum Silver Tablet (Multivits-Min/Fa/Lycopene/Lut) 1 Each Tablet 1 Each PO 

DAILY


Omeprazole 20 Mg Capsule.dr 20 Mg PO DAILY





Allergies


Allergies:  


Coded Allergies:  


     adhesive (Verified  Allergy, Mild, 14)





ROS


Review of System


GENERAL: Weakness.  No history of weight change or fevers.


SKIN:  No bruising, hair changes or rashes.


EYES:  No blurred, double or loss of vision.


NOSE AND THROAT:  No history of nosebleeds, hoarseness or sore throat.


HEART:  Denies chest pain, denies palpitations.


LUNGS:  Denies cough, hemoptysis, wheezing or shortness of breath.


GASTROINTESTINAL: Denies nausea, vomiting, abdominal pain.


GENITOURINARY: Denies dysuria, frequency, urgency, hematuria.


NEUROLOGIC:  Denies history of numbness, tingling, tremor or weakness.


PSYCHIATRIC: Denies anxiety, denies depression.


ENDOCRINE:  No history of heat or cold intolerance, polyuria or polydipsia.


EXTREMITIES: Some muscle weaknes.  Denies joint pain, pain on walking or 

stiffness.





Physical Exam


Physical Exam


General:  Alert, Oriented X3, Cooperative, No acute distress


HEENT:  PERRLA, EOMI


Lungs: Decreased breath sounds left lung base. Normal air movement


Heart:  RRR, no murmurs


Cardiovascular:  S1, S2


Abdomen:  Normal bowel sounds, Soft, No tenderness


Extremities:  No clubbing, No cyanosis


Skin:  No rashes, No significant lesion


Neuro:  Normal speech, Normal tone, Sensation intact


Psych/Mental Status:  Mental status NL, Mood NL





Vitals


Vitals





Vital Signs








  Date Time  Temp Pulse Resp B/P (MAP) Pulse Ox O2 Delivery O2 Flow Rate FiO2


 


3/29/22 07:30 98.0 88 18 133/74 (93) 93 Room Air  





 98.0       











Labs


Labs





Laboratory Tests








Test


 3/29/22


07:10


 


White Blood Count


 10.2 x10^3/uL


(4.0-11.0)


 


Red Blood Count


 5.06 x10^6/uL


(4.30-5.70)


 


Hemoglobin


 13.7 g/dL


(13.0-17.5)


 


Hematocrit


 42.8 %


(39.0-53.0)


 


Mean Corpuscular Volume 85 fL () 


 


Mean Corpuscular Hemoglobin 27 pg (25-35) 


 


Mean Corpuscular Hemoglobin


Concent 32 g/dL


(31-37)


 


Red Cell Distribution Width


 15.7 %


(11.5-14.5)


 


Platelet Count


 257 x10^3/uL


(140-400)


 


Neutrophils (%) (Auto) 65 % (31-73) 


 


Lymphocytes (%) (Auto) 14 % (24-48) 


 


Monocytes (%) (Auto) 10 % (0-9) 


 


Eosinophils (%) (Auto) 10 % (0-3) 


 


Basophils (%) (Auto) 1 % (0-3) 


 


Neutrophils # (Auto)


 6.6 x10^3/uL


(1.8-7.7)


 


Lymphocytes # (Auto)


 1.4 x10^3/uL


(1.0-4.8)


 


Monocytes # (Auto)


 1.0 x10^3/uL


(0.0-1.1)


 


Eosinophils # (Auto)


 1.0 x10^3/uL


(0.0-0.7)


 


Basophils # (Auto)


 0.1 x10^3/uL


(0.0-0.2)


 


Sodium Level


 140 mmol/L


(136-145)


 


Potassium Level


 4.1 mmol/L


(3.5-5.1)


 


Chloride Level


 104 mmol/L


()


 


Carbon Dioxide Level


 30 mmol/L


(21-32)


 


Anion Gap 6 (6-14) 


 


Blood Urea Nitrogen


 12 mg/dL


(8-26)


 


Creatinine


 0.7 mg/dL


(0.7-1.3)


 


Estimated GFR


(Cockcroft-Gault) 109.1 





 


Glucose Level


 95 mg/dL


(70-99)


 


Calcium Level


 10.7 mg/dL


(8.5-10.1)








Laboratory Tests








Test


 3/29/22


07:10


 


White Blood Count


 10.2 x10^3/uL


(4.0-11.0)


 


Red Blood Count


 5.06 x10^6/uL


(4.30-5.70)


 


Hemoglobin


 13.7 g/dL


(13.0-17.5)


 


Hematocrit


 42.8 %


(39.0-53.0)


 


Mean Corpuscular Volume 85 fL () 


 


Mean Corpuscular Hemoglobin 27 pg (25-35) 


 


Mean Corpuscular Hemoglobin


Concent 32 g/dL


(31-37)


 


Red Cell Distribution Width


 15.7 %


(11.5-14.5)


 


Platelet Count


 257 x10^3/uL


(140-400)


 


Neutrophils (%) (Auto) 65 % (31-73) 


 


Lymphocytes (%) (Auto) 14 % (24-48) 


 


Monocytes (%) (Auto) 10 % (0-9) 


 


Eosinophils (%) (Auto) 10 % (0-3) 


 


Basophils (%) (Auto) 1 % (0-3) 


 


Neutrophils # (Auto)


 6.6 x10^3/uL


(1.8-7.7)


 


Lymphocytes # (Auto)


 1.4 x10^3/uL


(1.0-4.8)


 


Monocytes # (Auto)


 1.0 x10^3/uL


(0.0-1.1)


 


Eosinophils # (Auto)


 1.0 x10^3/uL


(0.0-0.7)


 


Basophils # (Auto)


 0.1 x10^3/uL


(0.0-0.2)


 


Sodium Level


 140 mmol/L


(136-145)


 


Potassium Level


 4.1 mmol/L


(3.5-5.1)


 


Chloride Level


 104 mmol/L


()


 


Carbon Dioxide Level


 30 mmol/L


(21-32)


 


Anion Gap 6 (6-14) 


 


Blood Urea Nitrogen


 12 mg/dL


(8-26)


 


Creatinine


 0.7 mg/dL


(0.7-1.3)


 


Estimated GFR


(Cockcroft-Gault) 109.1 





 


Glucose Level


 95 mg/dL


(70-99)


 


Calcium Level


 10.7 mg/dL


(8.5-10.1)











Images


Images


PATIENT: NAYELY BECKER DACCOUNT: [id number]     MRN#: [id number]


: 1945           LOCATION: ER              AGE: 77


SEX: M                    EXAM DT: 22         ACCESSION#: [id number]


STATUS: REG ER            ORD. PHYSICIAN: ABILIO BREWSTER DO


REASON: Dizziness, weakness


PROCEDURE: PORTABLE CHEST 1V





EXAMINATION: Chest radiograph.





VIEWS: Single AP view of the chest





COMPARISON: Chest radiograph from 10/29/2021





INDICATION:77 years, Male, dizziness, weakness.





FINDINGS: 


Right chest Port-A-Cath with tip in the SVC. Stable cardiac mediastinal 

silhouette. Left perihilar and basilar opacity appears mildly increased from 

prior. Small left pleural effusion. Biapical pleural scarring. Right lung is 

clear. No acute osseous process. Cervical spinal fusion instrumentation is again

noted.





IMPRESSION:


Small left pleural effusion with lingular and left lower lobe infiltrate and/or 

scarring, this appears increased from prior.





Electronically signed by: Dameon Gipson DO (3/28/2022 2:50 PM) [id number]





VTE Prophylaxis Ordered


VTE Prophylaxis Devices:  No


VTE Pharmacological Prophylaxi:  Yes





Assessment/Plan


Assessment/Plan


Recurrent left pleural effusion





Plan:


X-ray from [facility] showed small left pleural effusion with lingular and left 

lower lobe infiltrate and/or scarring, that appears increased from prior.  He is

breathing comfortably on room air with his only concern being his intermittent 

dizziness and difficulty getting around the house.  Will repeat chest x-ray and 

consult pulmonology.  If immediate need for thoracentesis, will consult IR.  If 

not he may follow-up as outpatient for thoracentesis.


FEN - Cardiac diet


PPX  - Heparin


FULL CODE


Dispo - observation for above


Advance Care Planning: Total time spent face-to-face with patient 16 minutes in 

discussion with goals of care, comfort care, end-of-life care, code status; 

patient names his wife (Katie Becker) as surrogate decision-maker.





Justifications for Admission


Other Justification














KATIE BLAKE MD            Mar 29, 2022 09:48

## 2022-03-29 NOTE — NUR
SS following for discharge planning. SS reviewed pt chart and discussed with pt RN. Pt is 
from home with spouse and is currently on room air. History of lung cancer. Pt has left PE. 
Pulmonology consulted. PT/OT ordered. SS will continue to follow for discharge planning.

## 2022-03-29 NOTE — CONS
DATE OF CONSULTATION: 03/29/2022

PULMONARY CONSULTATION



ATTENDING PHYSICIAN:  Dandre West MD.



REASON FOR CONSULTATION:  Lung cancer, possible recurrent pleural effusion.



HISTORY OF PRESENT ILLNESS:  The patient is a 77-year-old male who is known to 

me from his previous hospitalization in August of last year.  The patient has a 

diagnosis of stage IV left lung adenocarcinoma.  He previously presented with a 

loculated left-sided pleural effusion, which required thoracentesis, followed by

TPA.  Cytology was positive for non-small cell lung cancer.  The patient has 

since been on immunotherapy with Keytruda.  According to the patient's wife, it 

did not work.  He was given the option of chemo and a port was placed as well, 

but he declined to have chemo.



He was brought into the hospital and was complaining of some dizziness.  He has 

some left chest wall tenderness.  He has no significant cough, no fever, no 

chills.  No headaches, no nausea, vomiting or diarrhea.



I reviewed the patient's chest x-ray and it revealed a mass effect in the left 

lung with possible small pleural effusion.  I have also reviewed decubitus film,

which did not show any significant free loculation.  Consultation requested for 

further evaluation.



PAST MEDICAL HISTORY:

1.  Significant for history of stage IV adenocarcinoma involving the left lung. 

The patient had malignant left pleural effusion.  He also had TPA due to 

loculations.

2.  Minimal history of tobacco use.

3.  History of coronary artery disease, hyperlipidemia, and low back pain.



PAST SURGICAL HISTORY:  Cervical fusion.  Prior left thoracentesis and chest 

tube and TPA, and tonsillectomy.



FAMILY HISTORY:  Coronary artery disease.



SOCIAL HISTORY:  Smoked for 10-15 years before quitting 44 years ago.



REVIEW OF SYSTEMS:  Twelve-point review of system obtained.  Pertinent positives

discussed in my present illness, otherwise noncontributory.  All systems that 

were negative were reviewed as well.



MEDICATIONS:  All reviewed as listed in the MRAD.



FAMILY HISTORY:  Noncontributory to lungs.



PHYSICAL EXAMINATION:

VITAL SIGNS:  Reviewed.  Pulse ox 96% on room air.  Blood pressure stable 133 

systolic, afebrile.

NECK:  Supple.

LUNGS:  With slightly diminished breath sounds, left base.

CARDIOVASCULAR:  With a regular rate.

ABDOMEN:  Soft, nontender.

EXTREMITIES:  With no pitting edema.



LABORATORY DATA:  Reviewed.  White cell count of 10.2, hemoglobin 13.7, 

platelets are 257.  BUN 12, creatinine 0.7.



IMPRESSION: 

1. The patient with stage IV adenocarcinoma with malignant left-sided 

pleural effusion, which required thoracentesis and TPA in the past.  He is 

status post Keytruda and being followed by [facility].  The patient has not 

responded to chemo according to the wife.  He deferred a systemic chemo.  Now, 

he presents with dizziness.  His chest x-ray shows a mass effect with possible 

small effusion.  Effusion not well seen on a decubitus film.  We will obtain a 

noncontrast CT chest for further evaluation.



RECOMMENDATIONS:

1.  Discussed with the patient and the wife.  We will obtain a noncontrast CT 

chest to better assess for effusion.  I suspect it could be small partially 
loculated and most of

it is the mass.

2.  The patient's oxygenation status is stable.

3.  Monitor blood pressure closely.

4.  The patient continues to defer any further chemo.

5.  The patient to follow up with [facility] as needed post-discharge.

6.  Further recommendations to follow after CT of the chest has been done.







DOM

DR: Nava   DD: 03/29/2022 11:09

DT: 03/29/2022 11:32   TID: [id number]

MTDD

## 2022-03-29 NOTE — RAD
EXAMINATION: CT Chest Without IV contrast.



INDICATION:77 years, Male, lung cancer, loculated left pleural effusion.



COMPARISON: PET/CT dated 9/3/2021. CT chest dated 8/17/2021. 



TECHNIQUE: Spiral CT was obtained from the jugular notch through the posterior costophrenic recess. S
agittal and coronal reformats were obtained.



Exposure: One or more of the following individualized dose reduction techniques were utilized for thi
s examination:  

1. Automated exposure control  

2. Adjustment of the mA and/or kV according to patient size  

3. Use of iterative reconstruction technique.



FINDINGS:

LUNGS/PLEURA: Central airways are patent. Large consolidative in the left lower lobe with volume loss
 and left-sided mediastinal shift. Innumerable left pleural and fissure based nodules/masses, the lar
gest measures 3.6 x 1.2 cm, overall worsened since prior exam. Trace amount of left pleural effusion.
 No pneumothorax.  There is a 3 mm solid pulmonary nodule in the medial aspect of right upper lobe (s
eries 3 image 27). There is a 3 mm solid pulmonary nodule in the right middle lobe (series 3 image 44
). Dependent subsegmental atelectasis in right lung base.



MEDIASTINUM: Nonspecific prominent left paratracheal lymph node measures up to 8 mm in short axis, un
changed since prior exam. Mild ascending thoracic aortic aneurysm measures up to 4.3 cm in maximum di
ameter. Pulmonary arteries are normal in caliber. The heart is normal in size. No pericardial effusio
n. Severe calcified coronary atherosclerosis. The visualized thyroid and the esophagus are unremarkab
le. Right Mediport catheter terminates in the distal SVC.



AXILLA/SOFT TISSUE: No supraclavicular or axillary adenopathy. Regional soft tissues are within gavin
l limits.



UPPER ABDOMEN: Fluid density 2.0 cm lesion in the superior pole left kidney, likely cyst. Large amoun
t of stool visualized colon.



BONES: No evidence of acute fractures or aggressive osseous lesions.



IMPRESSION:

1. Large consolidative opacity in the left lower lobe, compatible with known pulmonary malignancy wit
h collapsed lung, worsened since prior exam.

2. Increasing size and number of left pleural and fissure based nodules/masses compatible with metast
asis.

3. Two sub-5 mm solid pulmonary nodules in the right upper and middle lobes, indeterminate.

4. Trace amount of layering left pleural effusion.

5. Mild ascending thoracic aortic aneurysm.



Electronically signed by: Aman Thomson MD (3/29/2022 4:35 PM) [id number]

## 2022-03-29 NOTE — RAD
EXAMINATION: XR CHEST DECUBITUS 1V LT.



HISTORY: 77 years  Male  Reason: Recurrent left pleural effusion / 

COMPARISON: Chest x-ray from the same day.



Findings: Left decubitus view demonstrate suggestion of a small left pleural effusion. The positionin
g of the fluid does not appear to be significantly changed from the upright position radiograph sugge
stive of loculation. There is left the lower lobe infiltrate or atelectasis. The right lung is clear.
 The heart size appears enlarged. Right IJ infusion port and the cervical spine fusion hardware noted
.





Impression: Findings suggestive of small loculated left pleural effusion. Left basilar infiltrate or 
atelectasis. 



Electronically signed by: Luiz Layton MD (3/29/2022 4:23 PM) [id number]

## 2022-03-30 VITALS — DIASTOLIC BLOOD PRESSURE: 86 MMHG | SYSTOLIC BLOOD PRESSURE: 134 MMHG

## 2022-03-30 VITALS — SYSTOLIC BLOOD PRESSURE: 137 MMHG | DIASTOLIC BLOOD PRESSURE: 83 MMHG

## 2022-03-30 VITALS — DIASTOLIC BLOOD PRESSURE: 78 MMHG | SYSTOLIC BLOOD PRESSURE: 120 MMHG

## 2022-03-30 RX ADMIN — DOCUSATE SODIUM SCH MG: 100 CAPSULE, LIQUID FILLED ORAL at 09:16

## 2022-03-30 RX ADMIN — NITROGLYCERIN SCH PATCH: 0.2 PATCH TRANSDERMAL at 09:16

## 2022-03-30 RX ADMIN — CYCLOBENZAPRINE HYDROCHLORIDE SCH MG: 10 TABLET, FILM COATED ORAL at 09:16

## 2022-03-30 RX ADMIN — PSYLLIUM HUSK SCH PKT: 3.4 POWDER ORAL at 09:16

## 2022-03-30 RX ADMIN — HEPARIN SODIUM SCH UNIT: 5000 INJECTION, SOLUTION INTRAVENOUS; SUBCUTANEOUS at 09:16

## 2022-03-30 RX ADMIN — Medication SCH MG: at 09:16

## 2022-03-30 RX ADMIN — LOSARTAN POTASSIUM SCH MG: 50 TABLET ORAL at 09:15

## 2022-03-30 RX ADMIN — GABAPENTIN SCH MG: 100 CAPSULE ORAL at 09:15

## 2022-03-30 RX ADMIN — MULTIPLE VITAMINS W/ MINERALS TAB SCH TAB: TAB at 09:15

## 2022-03-30 NOTE — PDOC3
Discharge Summary


Visit Information


Date of Admission:  Mar 29, 2022


Date of Discharge:  Mar 30, 2022





Brief Hospital Course


Allergies





                                    Allergies








Coded Allergies Type Severity Reaction Last Updated Verified


 


  adhesive Allergy Mild  2/28/14 Yes








Vital Signs





Vital Signs








  Date Time  Temp Pulse Resp B/P (MAP) Pulse Ox O2 Delivery O2 Flow Rate FiO2


 


3/30/22 11:00 98.0 95 18 120/78 (92) 92   





 98.0       


 


3/29/22 20:00      Room Air  








Lab Results





Laboratory Tests








Test


 3/29/22


07:10 3/30/22


04:55


 


White Blood Count


 10.2 x10^3/uL


(4.0-11.0) 





 


Red Blood Count


 5.06 x10^6/uL


(4.30-5.70) 





 


Hemoglobin


 13.7 g/dL


(13.0-17.5) 





 


Hematocrit


 42.8 %


(39.0-53.0) 





 


Mean Corpuscular Volume 85 fL ()  


 


Mean Corpuscular Hemoglobin 27 pg (25-35)  


 


Mean Corpuscular Hemoglobin


Concent 32 g/dL


(31-37) 





 


Red Cell Distribution Width


 15.7 %


(11.5-14.5) 





 


Platelet Count


 257 x10^3/uL


(140-400) 





 


Neutrophils (%) (Auto) 65 % (31-73)  


 


Lymphocytes (%) (Auto) 14 % (24-48)  


 


Monocytes (%) (Auto) 10 % (0-9)  


 


Eosinophils (%) (Auto) 10 % (0-3)  


 


Basophils (%) (Auto) 1 % (0-3)  


 


Neutrophils # (Auto)


 6.6 x10^3/uL


(1.8-7.7) 





 


Lymphocytes # (Auto)


 1.4 x10^3/uL


(1.0-4.8) 





 


Monocytes # (Auto)


 1.0 x10^3/uL


(0.0-1.1) 





 


Eosinophils # (Auto)


 1.0 x10^3/uL


(0.0-0.7) 





 


Basophils # (Auto)


 0.1 x10^3/uL


(0.0-0.2) 





 


Sodium Level


 140 mmol/L


(136-145) 





 


Potassium Level


 4.1 mmol/L


(3.5-5.1) 





 


Chloride Level


 104 mmol/L


() 





 


Carbon Dioxide Level


 30 mmol/L


(21-32) 





 


Anion Gap 6 (6-14)  


 


Blood Urea Nitrogen


 12 mg/dL


(8-26) 





 


Creatinine


 0.7 mg/dL


(0.7-1.3) 





 


Estimated GFR


(Cockcroft-Gault) 109.1 


 





 


Glucose Level


 95 mg/dL


(70-99) 





 


Calcium Level


 10.7 mg/dL


(8.5-10.1) 





 


C-Reactive Protein,


Quantitative 


 7.4 mg/L


(0-3.3)








Laboratory Tests








Test


 3/30/22


04:55


 


C-Reactive Protein,


Quantitative 7.4 mg/L


(0-3.3)








Brief Hospital Course


Mr. Zheng  is a 77 old male who presented with:


Recurrent pleural effusion


Dizziness





Patient is a 77-year-old male with past medical history left lung 

adenocarcinoma, who presents to the ED as a transfer [facility] due to concerns 

for recurrent left pleural effusion.  He was initially sent to [facility] ER at 

the behest of his PCP, Dr. Mercedes.  Chest x-ray at [facility] did show a 

small left pleural effusion with lingular and left lower lobe infiltrate and/or 

scarring, that appears increased from prior.  He states he was admitted on 

8/10/2021 and had thoracentesis performed at that time.  He did follow-up with 

oncology, but states he decided not to pursue chemotherapy or radiation for the 

findings of stage IV lung adenocarcinoma.  Patient states he lives at home with 

his wife, and gets around at home rather gingerly.  At the time of my evaluation

he is breathing on room air.  Labs on admission were rather unremarkable.  He 

has been admitted for further medical management.





3/30: Patient evaluated with wife at bedside.  He is stable on room air.  I 

discussion with Dr. Ayoub, not a significant pleural effusion, certainly not 

amenable to drainage.  He has history of BPPV with physical therapy also 

discussed methods available at home to treat his dizzines (Epley maneuvers).  He

can discharge home today with family care.  Greater than 30 minutes spent 

managing the discharge of this patient.





Discharge Information


Condition at Discharge:  Stable


Disposition/Orders:  D/C to Home


Scheduled


Cholecalciferol (Vitamin D3) (Vitamin D3) 1,000 Unit Tablet, 1 TAB PO DAILY, #90

Ref 3 (Reported)


   Entered as Reported by: ALLYSON EMERSON on 4/24/15 0914


   Last Action: Reviewed on 3/29/22 0055 by Allyson Duong


Cyclobenzaprine Hcl (Cyclobenzaprine Hcl) 10 Mg Tablet, 10 MG PO BID for muscle 

relaxant, (Reported)


   Entered as Reported by: AWILDA QUEEN on 8/11/21 1046


   Last Action: Continued on 3/29/22 1142 by KATIE BLAKE MD


Dextran 70/Hypromellose (Artificial Tears) 1 Each Droperette, 1 EACH OU BID for 

eye dryness, (Reported)


   Entered as Reported by: TRACIE SOUZA on 3/29/22 1257


   Last Taken: Unknown Dose on Unknown Date & Time     Last Action: New Order on

3/29/22 1257 by TRACIE SOUZA


Docusate Sodium (Docusate Sodium) 100 Mg Capsule, 1 CAP PO BID for constipation 

for 7 Days, #14 Ref 0 (Reported)


   Entered as Reported by: AWILDA QUEEN on 8/11/21 1046


   Last Action: Continued on 3/29/22 1142 by KATIE BLAKE MD


Ferrous Sulfate (Ferrous Sulfate) 325 Mg Tablet, 1 TAB PO DAILY for supplement, 

#30 Ref 3 (Reported)


   Entered as Reported by: Allyson Duong on 3/29/22 0055


   Last Action: Continued on 3/29/22 1142 by KATIE BLAKE MD


Gabapentin (Gabapentin **) 100 Mg Capsule, 100 MG PO QID for NEUROGENIC PAIN, 

(Reported)


   Entered as Reported by: Allyson Duong on 3/29/22 0055


   Last Action: Edited on 3/29/22 2008 by TRACIE SOUZA


Hydroxyzine Hcl (Hydroxyzine Hcl) 25 Mg Tablet, 25 MG PO QID for itching, (R

eported)


   Entered as Reported by: Allyson Duong on 3/29/22 0055


   Last Action: Continued on 3/29/22 1142 by KATIE BLAKE MD


Losartan Potassium (Losartan Potassium) 50 Mg Tablet, 50 MG PO DAILY, (Reported)


   Entered as Reported by: ALLYSON EMERSON on 4/24/15 0914


   Last Action: Continued on 3/29/22 1142 by KATIE BLAKE MD


Multivits-Min/Fa/Lycopene/Lut (Centrum Silver Tablet) 1 Each Tablet, 1 EACH PO 

DAILY, (Reported)


   Entered as Reported by: RIGO VANESSA on 2/28/14 0910


   Last Action: Converted on 3/29/22 1142 by KATIE BLAKE MD


Niacin (Inositol Niacinate) (Niacin Flush Free 500 Mg Cap) 400 Mg Capsule, 500 

MG PO BID, (Reported)


   Entered as Reported by: RIGO VANESSA on 2/28/14 0910


   Last Action: Reviewed on 3/29/22 0055 by Allyson Duong


Nitroglycerin (NITRO-DUR 0.2mg/hr) 1 Each Patch.td24, 1 EACH TD DAILY, 

(Reported)


   Entered as Reported by: ALLYSON EMERSON on 4/24/15 0916


   Last Action: Continued on 3/29/22 1142 by KATIE BLAKE MD


Omeprazole (Omeprazole) 20 Mg Capsule.dr, 20 MG PO BID for rx, (Reported)


   Entered as Reported by: RIGO VANESSA on 2/28/14 0910


   Last Action: Edited on 3/29/22 2008 by TRACIE SOUZA


Psyllium Husk (Metamucil) 0.52 Gm Capsule, 0.52 GM PO BID for expectorant, 

(Reported)


   Entered as Reported by: AWILDA QUEEN on 8/11/21 1046


   Last Action: Converted on 3/29/22 1142 by KATIE BLAKE MD


Simvastatin (Simvastatin) 80 Mg Tablet, 80 MG PO HS, (Reported)


   Entered as Reported by: RIGO VANESSA on 2/28/14 0910


   Last Action: Converted on 3/29/22 1142 by KATIE BLAKE MD


[probiotic w/vit C]  , 2 PKT PO DAILY, (Reported)


   Entered as Reported by: JOSE TORREZ on 9/22/21 0904


   Last Action: Reviewed on 3/29/22 0055 by Allyson Duong





Scheduled PRN


Acetaminophen (Tylenol Extra Strength) 500 Mg Tablet, 2 TAB PO Q8HRS PRN for 

PAIN, (Reported)


   Entered as Reported by: JOSE TORREZ on 9/22/21 0904


   Last Action: Reviewed on 3/29/22 0055 by Allyson Duong





Discontinued Medications


Gentamicin Sulfate (Gentamicin Sulfate 0.3% Ophth Soln) 5 Ml Drops, 1 DROP 

EACHEYE BID for eye gtt for 5 Days, #5 Ref 0 (Reported)


   Entered as Reported by: AWILDA QUEEN on 8/11/21 1046


   Last Action: Discontinued on 3/29/22 1257 by TRACIE SOUZA


Nitroglycerin (NITRO-DUR 0.2mg/hr) 1 Each Patch.td24, 1 EACH TD DAILY for cad, 

(Reported)


   Entered as Reported by: Allyson Duong on 3/29/22 0055


   Last Action: Discontinued on 3/29/22 2008 by TRACIE SOUZA





Justicifation of Admission Dx:


Justifications for Admission:


Justification of Admission Dx:  Yes


Sepsis:  Hypoxemia











KATIE BLAKE MD            Mar 30, 2022 12:07

## 2022-03-30 NOTE — RAD
XR CHEST 1V 



INDICATION: Recurrent left pleural effusion . 



COMPARISON STUDY: 3/28/2022.



FINDINGS:



Life Support Devices: Right IJ Port-A-Cath.



Lungs: Elevation of the left hemidiaphragm. Left mid and lower lung opacities.



Pleura: Small left pleural effusion.



Heart and Mediastinum: Stable cardiomediastinal silhouette and great vessels. 



Bones and Soft Tissues: Stable regional skeleton and soft tissues.



IMPRESSION:  

1. Stable left mid and lower lung opacities.

2. Stable small to moderate left pleural effusion.



Electronically signed by: Irvin Pierson MD (3/30/2022 3:44 PM) [id number]

## 2022-03-30 NOTE — PDOC
[employer] PROGRESS NOTE


Date of Service


DOS:


DATE: 3/30/22 


TIME: 12:01





Chief Complaint


Chief Complaint


Recurrent left pleural effusion


Dizziness





History of Present Illness


History of Present Illness


Patient is a 77-year-old male with past medical history left lung janeth

ocarcinoma, who presents to the ED as a transfer [facility] due to concerns for 

recurrent left pleural effusion.  He was initially sent to [facility] ER at the 

behest of his PCP, Dr. Mercedes.  Chest x-ray at [facility] did show a small 

left pleural effusion with lingular and left lower lobe infiltrate and/or 

scarring, that appears increased from prior.  He states he was admitted on 

8/10/2021 and had thoracentesis performed at that time.  He did follow-up with 

oncology, but states he decided not to pursue chemotherapy or radiation for the 

findings of stage IV lung adenocarcinoma.  Patient states he lives at home with 

his wife, and gets around at home rather gingerly.  At the time of my evaluation

he is breathing on room air.  Labs on admission were rather unremarkable.  He 

has been admitted for further medical management.





3/30: Patient evaluated with wife at bedside.  He is stable on room air.  I 

discussion with Dr. Ayoub, not a significant pleural effusion, certainly not 

amenable to drainage.  He has history of BPPV with physical therapy also 

discussed methods available at home to treat his dizzines (Epley maneuvers).  He

can discharge home today with family care.  Greater than 30 minutes spent 

managing the discharge of this patient.





Vitals/I&O


Vitals/I&O:





                                   Vital Signs








  Date Time  Temp Pulse Resp B/P (MAP) Pulse Ox O2 Delivery O2 Flow Rate FiO2


 


3/30/22 11:00 98.0 95 18 120/78 (92) 92   





 98.0       


 


3/29/22 20:00      Room Air  














                                    I & O   


 


 3/29/22 3/29/22 3/30/22





 15:00 23:00 07:00


 


Intake Total 660 ml 480 ml 


 


Balance 660 ml 480 ml 











Physical Exam


General:  Alert, Oriented X3, Cooperative


Heart:  Regular rate


Lungs:  Crackles, Other


Abdomen:  Normal bowel sounds


Extremities:  No clubbing


Skin:  No rashes, No breakdown





Labs


Labs:





Laboratory Tests








Test


 3/30/22


04:55


 


C-Reactive Protein,


Quantitative 7.4 mg/L


(0-3.3)











Comment


Review of Relevant


I have reviewed the following items keya (where applicable) has been applied.


Medications:





Current Medications








 Medications


  (Trade)  Dose


 Ordered  Sig/Ellis


 Route


 PRN Reason  Start Time


 Stop Time Status Last Admin


Dose Admin


 


 Cyclobenzaprine


 HCl


  (Flexeril)  10 mg  BID


 PO


   3/29/22 21:00


    3/30/22 09:16





 


 Docusate Sodium


  (Colace)  100 mg  BID


 PO


   3/29/22 21:00


    3/30/22 09:16





 


 Hydroxyzine HCl


  (Atarax)  25 mg  QID


 PO


   3/29/22 13:00


    3/30/22 09:16





 


 Pantoprazole


 Sodium


  (Protonix)  40 mg  DAILYAC


 PO


   3/30/22 07:30


    3/30/22 09:16





 


 Simvastatin


  (Zocor)  80 mg  QHS


 PO


   3/29/22 21:00


    3/29/22 21:52














Justifications for Admission


Other Justification














KATIE BLAKE MD            Mar 30, 2022 12:05

## 2022-03-30 NOTE — PDOC
PULMONARY PROGRESS NOTES


DATE: 3/30/22 


TIME: 09:46


Subjective


Complaint of left chest wall pain.  No shortness of breath.


Remains on room air


Vitals





Vital Signs








  Date Time  Temp Pulse Resp B/P (MAP) Pulse Ox O2 Delivery O2 Flow Rate FiO2


 


3/30/22 09:15  89  137/83    


 


3/30/22 07:00 98.0  18  95   





 98.0       


 


3/29/22 20:00      Room Air  








General:  Alert, No acute distress


Lungs:  Other


Cardiovascular:  S1


Abdomen:  Soft


Extremities:  No Edema


Labs





Laboratory Tests








Test


 3/29/22


07:10 3/30/22


04:55


 


White Blood Count


 10.2 x10^3/uL


(4.0-11.0) 





 


Red Blood Count


 5.06 x10^6/uL


(4.30-5.70) 





 


Hemoglobin


 13.7 g/dL


(13.0-17.5) 





 


Hematocrit


 42.8 %


(39.0-53.0) 





 


Mean Corpuscular Volume 85 fL ()  


 


Mean Corpuscular Hemoglobin 27 pg (25-35)  


 


Mean Corpuscular Hemoglobin


Concent 32 g/dL


(31-37) 





 


Red Cell Distribution Width


 15.7 %


(11.5-14.5) 





 


Platelet Count


 257 x10^3/uL


(140-400) 





 


Neutrophils (%) (Auto) 65 % (31-73)  


 


Lymphocytes (%) (Auto) 14 % (24-48)  


 


Monocytes (%) (Auto) 10 % (0-9)  


 


Eosinophils (%) (Auto) 10 % (0-3)  


 


Basophils (%) (Auto) 1 % (0-3)  


 


Neutrophils # (Auto)


 6.6 x10^3/uL


(1.8-7.7) 





 


Lymphocytes # (Auto)


 1.4 x10^3/uL


(1.0-4.8) 





 


Monocytes # (Auto)


 1.0 x10^3/uL


(0.0-1.1) 





 


Eosinophils # (Auto)


 1.0 x10^3/uL


(0.0-0.7) 





 


Basophils # (Auto)


 0.1 x10^3/uL


(0.0-0.2) 





 


Sodium Level


 140 mmol/L


(136-145) 





 


Potassium Level


 4.1 mmol/L


(3.5-5.1) 





 


Chloride Level


 104 mmol/L


() 





 


Carbon Dioxide Level


 30 mmol/L


(21-32) 





 


Anion Gap 6 (6-14)  


 


Blood Urea Nitrogen


 12 mg/dL


(8-26) 





 


Creatinine


 0.7 mg/dL


(0.7-1.3) 





 


Estimated GFR


(Cockcroft-Gault) 109.1 


 





 


Glucose Level


 95 mg/dL


(70-99) 





 


Calcium Level


 10.7 mg/dL


(8.5-10.1) 





 


C-Reactive Protein,


Quantitative 


 7.4 mg/L


(0-3.3)








Laboratory Tests








Test


 3/30/22


04:55


 


C-Reactive Protein,


Quantitative 7.4 mg/L


(0-3.3)








Medications





Active Scripts








 Medications  Dose


 Route/Sig


 Max Daily Dose Days Date Category


 


 Artificial Tears


  (Dextran


 70/Hypromellose)


 1 Each Droperette  1 Each


 OU BID


   3/29/22 Reported


 


 Hydroxyzine Hcl


 25 Mg Tablet  25 Mg


 PO QID


   3/29/22 Reported


 


 Ferrous Sulfate


 325 Mg Tablet  1 Tab


 PO DAILY


   3/29/22 Reported


 


 Gabapentin **


  (Gabapentin) 100


 Mg Capsule  100 Mg


 PO QID


   3/29/22 Reported


 


 Tylenol Extra


 Strength


  (Acetaminophen)


 500 Mg Tablet  2 Tab


 PO Q8HRS PRN


   9/22/21 Reported


 


 [probiotic w/vit


 C]  2 Pkt


 PO DAILY


   9/22/21 Reported


 


 Docusate Sodium


 100 Mg Capsule  1 Cap


 PO BID


  7 8/11/21 Reported


 


 Metamucil


  (Psyllium Husk)


 0.52 Gm Capsule  0.52 Gm


 PO BID


   8/11/21 Reported


 


 Cyclobenzaprine


 Hcl 10 Mg Tablet  10 Mg


 PO BID


   8/11/21 Reported


 


 NITRO-DUR


 0.2mg/hr


  (Nitroglycerin) 1


 Each Patch.td24  1 Each


 TD DAILY


   4/24/15 Reported


 


 Vitamin D3


  (Cholecalciferol


  (Vitamin D3))


 1,000 Unit Tablet  1 Tab


 PO DAILY


   4/24/15 Reported


 


 Losartan


 Potassium 50 Mg


 Tablet  50 Mg


 PO DAILY


   4/24/15 Reported


 


 Simvastatin 80 Mg


 Tablet  80 Mg


 PO HS


   2/28/14 Reported


 


 Niacin Flush Free


 500 Mg Cap


  (Niacin (Inositol


 Niacinate)) 400


 Mg Capsule  500 Mg


 PO BID


   2/28/14 Reported


 


 Centrum Silver


 Tablet


  (Multivits-Min/Fa/Lycopene/Lut)


 1 Each Tablet  1 Each


 PO DAILY


   2/28/14 Reported


 


 Omeprazole 20 Mg


 Capsule.dr  20 Mg


 PO BID


   2/28/14 Reported








Comments


CT chest dated 3/29/2022 reviewed.


IMPRESSION:


1. Large consolidative opacity in the left lower lobe, compatible with known 

pulmonary malignancy with collapsed lung, worsened since prior exam.


2. Increasing size and number of left pleural and fissure based nodules/masses 

compatible with metastasis.


3. Two sub-5 mm solid pulmonary nodules in the right upper and middle lobes, 

indeterminate.


4. Trace amount of layering left pleural effusion.


5. Mild ascending thoracic aortic aneurysm.





Electronically signed by: Aman Thomson MD (3/29/2022 4:35 PM) [id number]





Impression


.


1. The patient with stage IV adenocarcinoma with malignant left-sided 


pleural effusion, which required thoracentesis and TPA in the past.  He is 


status post Keytruda and being followed by [facility].  The patient has not 


responded to chemo according to the wife.  He deferred a systemic chemo.  Now, 


he presents with dizziness.  His chest x-ray shows a mass effect with possible 


small effusion.  Effusion not well seen on a decubitus film.





Plan


.


RECOMMENDATIONS:


1.  Discussed with the patient.  CT chest reviewed.  It shows a mass in the left

lower lobe along with pleural metastasis and small loculated pleural effusion.  

This is all due to progression of tumor.  Will leave up to the patient if he 

wants to pursue any further chemo.  He has declined previously.


2.  The patient's oxygenation status is stable.


3.  Monitor blood pressure closely.


4.  The patient continues to defer any further chemo.


5.  The patient to follow up with [facility] as needed post-discharge.


6.  Patient can be discharged from a pulmonary standpoint.











SULEMA AC MD                 Mar 30, 2022 09:49

## 2022-03-30 NOTE — NUR
Discharge note:

Patient was discharged home with self care with wife as his caregiver. Patients IV was 
discontinued without any complications per CNA. Patient wife at bedside at the time of 
discharge education. Patients wife was given discharge summary/instructions, follow-ups and 
educational materials. Patients wife did not have any further questions or concerns. Patient 
was taken to the main entrance via wheelchair with all personal belongings accompanied by 
JAM Whitley, where wife pulled her car and taking patient home.

## 2022-05-04 ENCOUNTER — HOSPITAL ENCOUNTER (INPATIENT)
Dept: HOSPITAL 63 - ER | Age: 77
LOS: 5 days | Discharge: HOSPICE | DRG: 871 | End: 2022-05-09
Attending: FAMILY MEDICINE | Admitting: FAMILY MEDICINE
Payer: MEDICARE

## 2022-05-04 VITALS — HEIGHT: 68 IN | BODY MASS INDEX: 25.99 KG/M2 | WEIGHT: 171.52 LBS

## 2022-05-04 VITALS — DIASTOLIC BLOOD PRESSURE: 68 MMHG | SYSTOLIC BLOOD PRESSURE: 104 MMHG

## 2022-05-04 VITALS — DIASTOLIC BLOOD PRESSURE: 47 MMHG | SYSTOLIC BLOOD PRESSURE: 106 MMHG

## 2022-05-04 DIAGNOSIS — A41.9: Primary | ICD-10-CM

## 2022-05-04 DIAGNOSIS — Z66: ICD-10-CM

## 2022-05-04 DIAGNOSIS — L29.9: ICD-10-CM

## 2022-05-04 DIAGNOSIS — K29.50: ICD-10-CM

## 2022-05-04 DIAGNOSIS — Z88.8: ICD-10-CM

## 2022-05-04 DIAGNOSIS — Z87.891: ICD-10-CM

## 2022-05-04 DIAGNOSIS — E43: ICD-10-CM

## 2022-05-04 DIAGNOSIS — K21.9: ICD-10-CM

## 2022-05-04 DIAGNOSIS — I50.9: ICD-10-CM

## 2022-05-04 DIAGNOSIS — D63.8: ICD-10-CM

## 2022-05-04 DIAGNOSIS — J18.9: ICD-10-CM

## 2022-05-04 DIAGNOSIS — E11.65: ICD-10-CM

## 2022-05-04 DIAGNOSIS — E83.52: ICD-10-CM

## 2022-05-04 DIAGNOSIS — I25.10: ICD-10-CM

## 2022-05-04 DIAGNOSIS — G89.3: ICD-10-CM

## 2022-05-04 DIAGNOSIS — C79.51: ICD-10-CM

## 2022-05-04 DIAGNOSIS — C34.32: ICD-10-CM

## 2022-05-04 DIAGNOSIS — Z90.49: ICD-10-CM

## 2022-05-04 DIAGNOSIS — Z85.118: ICD-10-CM

## 2022-05-04 DIAGNOSIS — E78.00: ICD-10-CM

## 2022-05-04 LAB
% BANDS: 4 % (ref 0–9)
% LYMPHS: 14 % (ref 24–48)
% MONOS: 5 % (ref 0–10)
% SEGS: 73 % (ref 35–66)
ALBUMIN SERPL-MCNC: 2.9 G/DL (ref 3.4–5)
ALBUMIN/GLOB SERPL: 0.8 {RATIO} (ref 1–1.7)
ALP SERPL-CCNC: 72 U/L (ref 46–116)
ALT SERPL-CCNC: 16 U/L (ref 16–63)
AMPHETAMINE/METHAMPHETAMINE: (no result)
ANION GAP SERPL CALC-SCNC: 12 MMOL/L (ref 6–14)
APTT PPP: YELLOW S
AST SERPL-CCNC: 19 U/L (ref 15–37)
BACTERIA #/AREA URNS HPF: 0 /HPF
BARBITURATES UR-MCNC: (no result) UG/ML
BASOPHILS # BLD AUTO: 0 X10^3/UL (ref 0–0.2)
BASOPHILS NFR BLD: 0 % (ref 0–3)
BENZODIAZ UR-MCNC: (no result) UG/L
BILIRUB SERPL-MCNC: 0.3 MG/DL (ref 0.2–1)
BUN/CREAT SERPL: 20 (ref 6–20)
CA-I SERPL ISE-MCNC: 16 MG/DL (ref 8–26)
CALCIUM SERPL-MCNC: 10.3 MG/DL (ref 8.5–10.1)
CANNABINOIDS UR-MCNC: (no result) UG/L
CHLORIDE SERPL-SCNC: 100 MMOL/L (ref 98–107)
CO2 SERPL-SCNC: 25 MMOL/L (ref 21–32)
COCAINE UR-MCNC: (no result) NG/ML
CREAT SERPL-MCNC: 0.8 MG/DL (ref 0.7–1.3)
CRP SERPL-MCNC: 25.8 MG/L (ref 0–3.3)
EOSINOPHIL NFR BLD AUTO: 4 % (ref 0–5)
EOSINOPHIL NFR BLD: 0.7 X10^3/UL (ref 0–0.7)
EOSINOPHIL NFR BLD: 4 % (ref 0–3)
ERYTHROCYTE [DISTWIDTH] IN BLOOD BY AUTOMATED COUNT: 13.6 % (ref 11.5–14.5)
FIBRINOGEN PPP-MCNC: CLEAR MG/DL
GFR SERPLBLD BASED ON 1.73 SQ M-ARVRAT: 93.5 ML/MIN
GLOBULIN SER-MCNC: 3.5 G/DL (ref 2.2–3.8)
GLUCOSE SERPL-MCNC: 125 MG/DL (ref 70–99)
GLUCOSE UR STRIP-MCNC: (no result) MG/DL
HCT VFR BLD CALC: 39.7 % (ref 39–53)
HGB BLD-MCNC: 13 G/DL (ref 13–17.5)
INFLUENZA A PATIENT: NEGATIVE
INFLUENZA B PATIENT: NEGATIVE
LYMPHOCYTES # BLD: 1.3 X10^3/UL (ref 1–4.8)
LYMPHOCYTES NFR BLD AUTO: 8 % (ref 24–48)
MAGNESIUM SERPL-MCNC: 1.8 MG/DL (ref 1.8–2.4)
MCH RBC QN AUTO: 28 PG (ref 25–35)
MCHC RBC AUTO-ENTMCNC: 33 G/DL (ref 31–37)
MCV RBC AUTO: 87 FL (ref 79–100)
METHADONE SERPL-MCNC: (no result) NG/ML
MONO #: 1.4 X10^3/UL (ref 0–1.1)
MONOCYTES NFR BLD: 9 % (ref 0–9)
NEUT #: 13 X10^3UL (ref 1.8–7.7)
NEUTROPHILS NFR BLD AUTO: 79 % (ref 31–73)
NITRITE UR QL STRIP: (no result)
OPIATES UR-MCNC: (no result) NG/ML
PCP SERPL-MCNC: (no result) MG/DL
PHOSPHATE SERPL-MCNC: 3.7 MG/DL (ref 2.6–4.7)
PLATELET # BLD AUTO: 261 X10^3/UL (ref 140–400)
PLATELET # BLD EST: ADEQUATE 10*3/UL
POTASSIUM SERPL-SCNC: 4.7 MMOL/L (ref 3.5–5.1)
PROT SERPL-MCNC: 6.4 G/DL (ref 6.4–8.2)
RBC # BLD AUTO: 4.59 X10^6/UL (ref 4.3–5.7)
RBC #/AREA URNS HPF: (no result) /HPF (ref 0–2)
SODIUM SERPL-SCNC: 137 MMOL/L (ref 136–145)
SP GR UR STRIP: 1.02
SQUAMOUS #/AREA URNS LPF: (no result) /LPF
UROBILINOGEN UR-MCNC: 0.2 MG/DL
WBC # BLD AUTO: 16.4 X10^3/UL (ref 4–11)
WBC #/AREA URNS HPF: (no result) /HPF (ref 0–4)

## 2022-05-04 PROCEDURE — 80048 BASIC METABOLIC PNL TOTAL CA: CPT

## 2022-05-04 PROCEDURE — 93005 ELECTROCARDIOGRAM TRACING: CPT

## 2022-05-04 PROCEDURE — 74177 CT ABD & PELVIS W/CONTRAST: CPT

## 2022-05-04 PROCEDURE — 71046 X-RAY EXAM CHEST 2 VIEWS: CPT

## 2022-05-04 PROCEDURE — 84484 ASSAY OF TROPONIN QUANT: CPT

## 2022-05-04 PROCEDURE — 82550 ASSAY OF CK (CPK): CPT

## 2022-05-04 PROCEDURE — 71260 CT THORAX DX C+: CPT

## 2022-05-04 PROCEDURE — 80307 DRUG TEST PRSMV CHEM ANLYZR: CPT

## 2022-05-04 PROCEDURE — 86140 C-REACTIVE PROTEIN: CPT

## 2022-05-04 PROCEDURE — 71045 X-RAY EXAM CHEST 1 VIEW: CPT

## 2022-05-04 PROCEDURE — 81001 URINALYSIS AUTO W/SCOPE: CPT

## 2022-05-04 PROCEDURE — 85025 COMPLETE CBC W/AUTO DIFF WBC: CPT

## 2022-05-04 PROCEDURE — 83735 ASSAY OF MAGNESIUM: CPT

## 2022-05-04 PROCEDURE — 87428 SARSCOV & INF VIR A&B AG IA: CPT

## 2022-05-04 PROCEDURE — 83880 ASSAY OF NATRIURETIC PEPTIDE: CPT

## 2022-05-04 PROCEDURE — 80202 ASSAY OF VANCOMYCIN: CPT

## 2022-05-04 PROCEDURE — 02HV33Z INSERTION OF INFUSION DEVICE INTO SUPERIOR VENA CAVA, PERCUTANEOUS APPROACH: ICD-10-PCS | Performed by: FAMILY MEDICINE

## 2022-05-04 PROCEDURE — 96367 TX/PROPH/DG ADDL SEQ IV INF: CPT

## 2022-05-04 PROCEDURE — 82947 ASSAY GLUCOSE BLOOD QUANT: CPT

## 2022-05-04 PROCEDURE — 96361 HYDRATE IV INFUSION ADD-ON: CPT

## 2022-05-04 PROCEDURE — 36415 COLL VENOUS BLD VENIPUNCTURE: CPT

## 2022-05-04 PROCEDURE — U0003 INFECTIOUS AGENT DETECTION BY NUCLEIC ACID (DNA OR RNA); SEVERE ACUTE RESPIRATORY SYNDROME CORONAVIRUS 2 (SARS-COV-2) (CORONAVIRUS DISEASE [COVID-19]), AMPLIFIED PROBE TECHNIQUE, MAKING USE OF HIGH THROUGHPUT TECHNOLOGIES AS DESCRIBED BY CMS-2020-01-R: HCPCS

## 2022-05-04 PROCEDURE — 85610 PROTHROMBIN TIME: CPT

## 2022-05-04 PROCEDURE — 84100 ASSAY OF PHOSPHORUS: CPT

## 2022-05-04 PROCEDURE — 87040 BLOOD CULTURE FOR BACTERIA: CPT

## 2022-05-04 PROCEDURE — 96365 THER/PROPH/DIAG IV INF INIT: CPT

## 2022-05-04 PROCEDURE — 80053 COMPREHEN METABOLIC PANEL: CPT

## 2022-05-04 PROCEDURE — 96375 TX/PRO/DX INJ NEW DRUG ADDON: CPT

## 2022-05-04 PROCEDURE — 85007 BL SMEAR W/DIFF WBC COUNT: CPT

## 2022-05-04 RX ADMIN — TRIAMCINOLONE ACETONIDE SCH APP: 5 CREAM TOPICAL at 21:45

## 2022-05-04 RX ADMIN — POLYVINYL ALCOHOL SCH DROP: 14 SOLUTION/ DROPS OPHTHALMIC at 21:45

## 2022-05-04 RX ADMIN — PANTOPRAZOLE SODIUM SCH MG: 40 TABLET, DELAYED RELEASE ORAL at 21:43

## 2022-05-04 RX ADMIN — HYDROCODONE BITARTRATE AND ACETAMINOPHEN PRN TAB: 5; 325 TABLET ORAL at 21:44

## 2022-05-04 RX ADMIN — DOCUSATE SODIUM SCH MG: 100 CAPSULE, LIQUID FILLED ORAL at 21:43

## 2022-05-04 RX ADMIN — GABAPENTIN SCH MG: 100 CAPSULE ORAL at 21:43

## 2022-05-04 RX ADMIN — PIPERACILLIN SODIUM AND TAZOBACTAM SODIUM SCH MLS/HR: 3; .375 INJECTION, POWDER, LYOPHILIZED, FOR SOLUTION INTRAVENOUS at 21:55

## 2022-05-04 NOTE — RAD
EXAM:  XR CHEST 1V 5/4/2022 1:00 PM



CLINICAL INDICATION:  Cough, fever, history of lung cancer



COMPARISON:  Chest radiograph 3/28/2022



TECHNIQUE:  AP view of the chest



FINDINGS:  A right chest wall port is unchanged with tip over the upper superior vena cava. The heart
 is normal in size. There is left lung volume loss and an unchanged small left pleural effusion and b
asilar opacities. The right lung is clear. No pneumothorax. There is surgical fusion hardware. There 
is gaseous distention of the colon in the upper abdomen, unchanged.



IMPRESSION:  Unchanged small left pleural effusion and basilar opacities with volume loss.



Electronically signed by: Stefania Nguyễn MD (5/4/2022 1:49 PM) [id number]

## 2022-05-04 NOTE — EKG
[facility] [address], [place] [address]

Test Date:    2022               Test Time:    12:43:43

Pat Name:     NAYELY BECKER         Department:   

Patient ID:   [id number]           Room:          

Gender:       M                        Technician:   CORWIN

:          1945               Requested By: LEVON COLLINS

Order Number: [id number]            Reading MD:   Shalom Cunha MD

                                 Measurements

Intervals                              Axis          

Rate:         122                      P:            -58

OR:           208                      QRS:          -56

QRSD:         88                       T:            51

QT:           276                                    

QTc:          394                                    

                           Interpretive Statements

SINUS TACHYCARDIA

NON-SPECIFIC ST/T CHANGES

BASELINE ARTIFACT

Electronically Signed On 2022 9:11:09 CDT by Shalom Cunha MD

## 2022-05-04 NOTE — NUR
Pharmacy Vancomycin Dosing Note



S:Consulted to monitor and dose vancomycin started 05/04/22.



O:NAYELY BECKER is a 77 year old M with 

Sepsis

Pneumonia, .



Height: 5 feet, 8 inches

Weight: 77.0 kg

Ideal Body Weight: 68.40 

Adjusted Body Weight: 71.84 

Dosing Weight: Actual



Other Antibiotics: 

ZOSYN 3.375GM IV Q6HR



LABS:

Last BUN: 16 

Last Creatinine: 0.8 

Creatinine Clearance: 61.86 

Last WBC: 16.4 





Vancomycin Dosing:

Loading Dose: 1750 mg x1

Dosing Weight: Actual

Target Trough: 10-20





A: Based on: Actual weight, renal function, and diagnosis





P: 1. Begin Vancomycin 1000 mg IV q12h

   2. Follow up Trough level on 05/06/22 at 0430 

   3. Pharmacy will continue to monitor, follow and adjust therapy as needed.

 

 KRYSTYNA EVANS,  05/04/22 [id number]

## 2022-05-04 NOTE — NUR
ADMISSION

PATIENT ARRIVED ON UNIT VIA EMS ON [facility]. PATIENT IS HEAVILY SEDATED BUT RESPONSIVE TO 
NAME. WILL ANSWER WITH SHORT, APPROPRIATE RESPONSES. SPOKE WITH WIFE TO OBTAIN ADMISSION 
INFORMATION. UNABLE TO FULLY ORIENT TO UNIT ROUTINE. PATIENT IS CURRENTLY IN BED WITH SIDE 
RAILS UP X2, BED ALARM, CALL LIGHT IN REACH. WILL CONTINUE TO MONITOR.

## 2022-05-04 NOTE — RAD
CTA chest abdomen pelvis with contrast dated 5/4/2022



COMPARISON: 8/5/2021



INDICATION: Fever of unknown origin.



TECHNIQUE:



Contiguous axial imaging the chest abdomen pelvis performed after the administration of 100 cc Omnipa
que 300. 

One or more of the following individualized dose reduction techniques were utilized for this examinat
ion:  

1. Automated exposure control  

2. Adjustment of the mA and/or kV according to patient size  

3. Use of iterative reconstruction technique.



FINDINGS:



There is a dense area of consolidation in the left lower lobe with occlusion of the left lower lobe a
irways. There is also multifocal areas of nodular pleural thickening on the left. Pleural nodules ext
end to the apical region involves both the medial and lateral pleural margins. Largest nodules measur
e up to 1.5 cm. No definite rib destruction. There is a hyperdense nodule in the chest wall posterior
ly on the left that measures about 2 cm on image 92, new from prior study.



Heart size is mildly enlarged. No pericardial effusion. Coronary artery calcifications. Ectasia of th
e ascending thoracic aorta measuring 4 cm transverse.



Enlarged subcarinal lymph node measuring 1.5 cm short axis. There are also mildly enlarged left hilar
, left supraclavicular and left internal mammary chain lymph nodes. No definite axillary adenopathy.



There are couple of noncalcified pulmonary nodules on the left. One along the major fissure on image 
58 measures 1.3 cm. There is also a 3 mm noncalcified nodule in the superior segment left lower lobe 
on image 58. Noncalcified nodule in the left lower lobe on image 65 measures 1.3 cm. No right-sided p
ulmonary nodule or right-sided pleural effusion.



Images of the abdomen show nodular thickening of the left hemidiaphragm that extend inferiorly along 
the peritoneal surfaces of the anterior abdominal wall. There are borderline enlarged pericardiophren
ic lymph nodes. No significant ascites. No significant retroperitoneal or mesenteric lymphadenopathy.




Liver is homogeneous. Spleen is normal in size. Gallbladder is mildly distended. Adrenal glands and k
idneys are unremarkable. There are well-circumscribed low-density foci at the upper and midpole left 
kidney, likely cysts. No hydronephrosis.



Unopacified GI tract normal in caliber and contour. No bowel wall thickening. Abdominal aorta normal 
in caliber. Moderate stool throughout colon.



Images of pelvis show nondistended urinary bladder. Prostate gland is moderately enlarged. No free fl
uid or pelvic adenopathy.



Bone windows show no acute findings. Multilevel spondylosis. There is an indeterminate radiolucent fo
cus within the L3 vertebral body



IMPRESSION:

1. Dense consolidation of the left lower lobe with nodular areas of pleural thickening, suspicious fo
r primary malignancy and pleural spread.

2. Multiple pulmonary nodules consistent with metastatic disease. There are also enlarged mediastinal
, left hilar and left supraclavicular lymph nodes suggestive of metastatic lymph node spread.

3. There is some nodular thickening of the left hemidiaphragm and along the anterior peritoneal surfa
devin on the left suggesting localized spread. There are also mildly enlarged pericardiophrenic paraeso
phageal lymph nodes.

4. There is a hyperdense nodule along the left chest wall musculature the could represent chest wall 
soft tissue metastasis.

5. Wall thickening of the stomach, nonspecific. Consider acute or chronic gastritis. Gastric malignan
cy not excluded.

6. Indeterminate radiolucent focus at the L3 vertebral body. Early bone metastasis is not excluded.



Electronically signed by: Nabil King MD (5/4/2022 2:56 PM) [facility] Center[facility]LAN

## 2022-05-04 NOTE — PHYS DOC
Past History


Additional Past Medical Histor:  Lung Cancer


Past Surgical History:  Cervical Fusion, Tonsillectomy


Alcohol Use:  None





General Adult


EDM:


Chief Complaint:  WEAKNESS/GENERALIZED





HPI:


HPI:





Patient is a 77-year-old male coming in via EMS from home for weakness.  Patient

is a poor historian history provided by EMS.  His wife called because he was 

having difficulty getting up and around and out of bed.  Patient has a history 

lung cancer however has been off chemotherapy has transition to palliative care.

 Patient also was noted to have a cough and a temperature of 101.





Review of Systems:


Review of Systems:


All other systems within normal limits except for as noted in the HPI





Current Medications:


Current Meds:





Current Medications








 Medications


  (Trade)  Dose


 Ordered  Sig/Ellis  Start Time


 Stop Time Status Last Admin


Dose Admin


 


 Tamsulosin HCl


  (Flomax)  0.4 mg  1X  ONCE  22 13:00


 22 13:01 DC  














Allergies:


Allergies:





Allergies








Coded Allergies Type Severity Reaction Last Updated Verified


 


  adhesive tape Allergy Unknown  22 Yes











Physical Exam:


PE:


Constitutional: Well developed, well nourished, no acute distress, non-toxic 

appearance. []


HENT: Normocephalic, atraumatic, bilateral external ears normal,  nose normal. 

[]


Eyes: PERRLA, conjunctiva normal, no discharge. [] 


Neck: No rigidity, supple, no stridor. [] 


Cardiovascular: Regular rate and rhythm, brisk cap refill []


Lungs & Thorax: Non labored symmetric respirations, no tachypnea or respiratory 

distress []


Abdomen: Soft, nondistended.


Skin: Warm, dry, no erythema, no rash. [] 


Back: Unremarkable


Extremities: No deformities, range of motion grossly intact, no lower extremity 

edema [] 


Neurologic: Alert and oriented X 3, no focal deficits noted. []


Psychologic: Affect normal, judgement normal, mood normal. []





Current Patient Data:


Labs:





                                Laboratory Tests








Test


 22


12:39


 


Glucose (Fingerstick)


 108 mg/dL


(70-99)  H








Vital Signs:





                                   Vital Signs








  Date Time  Temp Pulse Resp B/P (MAP) Pulse Ox O2 Delivery O2 Flow Rate FiO2


 


22 12:44 101.0 108 24 141/87 (105) 96 Room Air  











EKG:


EKG:


[]





Radiology/Procedures:


Radiology/Procedures:


[facility] [address], [place] [address]


                                 [phone]


                                        


                                 IMAGING REPORT





                                     Signed





PATIENT: NAYELY BECKER DACCOUNT: [id number]     MRN#: [id number]


: 1945           LOCATION: ER              AGE: 77


SEX: M                    EXAM DT: 22         ACCESSION#: [id number]


STATUS: REG ER            ORD. PHYSICIAN: LEVON COLLINS MD


REASON: fever, unknown source,GIVEN 100 LM OMNI 300


PROCEDURE: CT CHEST ABD PELVIS W/CONTRAST





CTA chest abdomen pelvis with contrast dated 2022





COMPARISON: 2021





INDICATION: Fever of unknown origin.





TECHNIQUE:





Contiguous axial imaging the chest abdomen pelvis performed after the 

administration of 100 cc Omnipaque 300. 


One or more of the following individualized dose reduction techniques were 

utilized for this examination:  


1. Automated exposure control  


2. Adjustment of the mA and/or kV according to patient size  


3. Use of iterative reconstruction technique.





FINDINGS:





There is a dense area of consolidation in the left lower lobe with occlusion of 

the left lower lobe airways. There is also multifocal areas of nodular pleural 

thickening on the left. Pleural nodules extend to the apical region involves 

both the medial and lateral pleural margins. Largest nodules measure up to 1.5 

cm. No definite rib destruction. There is a hyperdense nodule in the chest wall 

posteriorly on the left that measures about 2 cm on image 92, new from prior 

study.





Heart size is mildly enlarged. No pericardial effusion. Coronary artery arslan

cifications. Ectasia of the ascending thoracic aorta measuring 4 cm transverse.





Enlarged subcarinal lymph node measuring 1.5 cm short axis. There are also 

mildly enlarged left hilar, left supraclavicular and left internal mammary chain

 lymph nodes. No definite axillary adenopathy.





There are couple of noncalcified pulmonary nodules on the left. One along the 

major fissure on image 58 measures 1.3 cm. There is also a 3 mm noncalcified 

nodule in the superior segment left lower lobe on image 58. Noncalcified nodule 

in the left lower lobe on image 65 measures 1.3 cm. No right-sided pulmonary 

nodule or right-sided pleural effusion.





Images of the abdomen show nodular thickening of the left hemidiaphragm that 

extend inferiorly along the peritoneal surfaces of the anterior abdominal wall. 

There are borderline enlarged pericardiophrenic lymph nodes. No significant 

ascites. No significant retroperitoneal or mesenteric lymphadenopathy.





Liver is homogeneous. Spleen is normal in size. Gallbladder is mildly distended.

 Adrenal glands and kidneys are unremarkable. There are well-circumscribed low-

density foci at the upper and midpole left kidney, likely cysts. No 

hydronephrosis.





Unopacified GI tract normal in caliber and contour. No bowel wall thickening. 

Abdominal aorta normal in caliber. Moderate stool throughout colon.





Images of pelvis show nondistended urinary bladder. Prostate gland is moderately

 enlarged. No free fluid or pelvic adenopathy.





Bone windows show no acute findings. Multilevel spondylosis. There is an in

determinate radiolucent focus within the L3 vertebral body





IMPRESSION:


1. Dense consolidation of the left lower lobe with nodular areas of pleural 

thickening, suspicious for primary malignancy and pleural spread.


2. Multiple pulmonary nodules consistent with metastatic disease. There are also

 enlarged mediastinal, left hilar and left supraclavicular lymph nodes 

suggestive of metastatic lymph node spread.


3. There is some nodular thickening of the left hemidiaphragm and along the 

anterior peritoneal surfaces on the left suggesting localized spread. There are 

also mildly enlarged pericardiophrenic paraesophageal lymph nodes.


4. There is a hyperdense nodule along the left chest wall musculature the could 

represent chest wall soft tissue metastasis.


5. Wall thickening of the stomach, nonspecific. Consider acute or chronic 

gastritis. Gastric malignancy not excluded.


6. Indeterminate radiolucent focus at the L3 vertebral body. Early bone 

metastasis is not excluded.





Electronically signed by: Nabil King MD (2022 2:56 PM) [facility] [place]














DICTATED AND SIGNED BY:     NABIL KING MD


DATE:     22 1449





CC: LEVON COLLINS MD; MEHDI ROSALES MD ~


[]





Heart Score:


C/O Chest Pain:  No


Risk Factors:


Risk Factors:  DM, Current or recent (<one month) smoker, HTN, HLP, family 

history of CAD, obesity.


Risk Scores:


Score 0 - 3:  2.5% MACE over next 6 weeks - Discharge Home


Score 4 - 6:  20.3% MACE over next 6 weeks - Admit for Clinical Observation


Score 7 - 10:  72.7% MACE over next 6 weeks - Early Invasive Strategies





Course & Med Decision Making:


Course & Med Decision Making


Pertinent Labs and Imaging studies reviewed. (See chart for details)





[]





Dragon Disclaimer:


Dragon Disclaimer:


This electronic medical record was generated, in whole or in part, using a voice

 recognition dictation system.





Departure


Departure:


Impression:  


   Primary Impression:  


   Fever


   Additional Impression:  


   Sepsis due to undetermined organism


Disposition:   ADMITTED AS INPATIENT


Admitting Physician:  Mehdi Rosales


Condition:  STABLE


Referrals:  


MEHDI ROSALES MD (PCP)











LEVON COLLINS MD             May 4, 2022 13:05

## 2022-05-05 VITALS — SYSTOLIC BLOOD PRESSURE: 105 MMHG | DIASTOLIC BLOOD PRESSURE: 69 MMHG

## 2022-05-05 VITALS — DIASTOLIC BLOOD PRESSURE: 64 MMHG | SYSTOLIC BLOOD PRESSURE: 104 MMHG

## 2022-05-05 VITALS — DIASTOLIC BLOOD PRESSURE: 67 MMHG | SYSTOLIC BLOOD PRESSURE: 112 MMHG

## 2022-05-05 VITALS — SYSTOLIC BLOOD PRESSURE: 106 MMHG | DIASTOLIC BLOOD PRESSURE: 65 MMHG

## 2022-05-05 VITALS — SYSTOLIC BLOOD PRESSURE: 103 MMHG | DIASTOLIC BLOOD PRESSURE: 67 MMHG

## 2022-05-05 LAB
ANION GAP SERPL CALC-SCNC: 12 MMOL/L (ref 6–14)
BASOPHILS # BLD AUTO: 0.1 X10^3/UL (ref 0–0.2)
BASOPHILS NFR BLD: 0 % (ref 0–3)
CA-I SERPL ISE-MCNC: 18 MG/DL (ref 8–26)
CALCIUM SERPL-MCNC: 9.8 MG/DL (ref 8.5–10.1)
CHLORIDE SERPL-SCNC: 102 MMOL/L (ref 98–107)
CO2 SERPL-SCNC: 25 MMOL/L (ref 21–32)
CREAT SERPL-MCNC: 1 MG/DL (ref 0.7–1.3)
EOSINOPHIL NFR BLD: 0.2 X10^3/UL (ref 0–0.7)
EOSINOPHIL NFR BLD: 1 % (ref 0–3)
ERYTHROCYTE [DISTWIDTH] IN BLOOD BY AUTOMATED COUNT: 13.4 % (ref 11.5–14.5)
GFR SERPLBLD BASED ON 1.73 SQ M-ARVRAT: 72.3 ML/MIN
GLUCOSE SERPL-MCNC: 92 MG/DL (ref 70–99)
HCT VFR BLD CALC: 36.6 % (ref 39–53)
HGB BLD-MCNC: 12 G/DL (ref 13–17.5)
LYMPHOCYTES # BLD: 1.3 X10^3/UL (ref 1–4.8)
LYMPHOCYTES NFR BLD AUTO: 8 % (ref 24–48)
MCH RBC QN AUTO: 28 PG (ref 25–35)
MCHC RBC AUTO-ENTMCNC: 33 G/DL (ref 31–37)
MCV RBC AUTO: 87 FL (ref 79–100)
MONO #: 1.9 X10^3/UL (ref 0–1.1)
MONOCYTES NFR BLD: 12 % (ref 0–9)
NEUT #: 13.1 X10^3UL (ref 1.8–7.7)
NEUTROPHILS NFR BLD AUTO: 79 % (ref 31–73)
PLATELET # BLD AUTO: 235 X10^3/UL (ref 140–400)
POTASSIUM SERPL-SCNC: 3.8 MMOL/L (ref 3.5–5.1)
RBC # BLD AUTO: 4.21 X10^6/UL (ref 4.3–5.7)
SODIUM SERPL-SCNC: 139 MMOL/L (ref 136–145)
WBC # BLD AUTO: 16.5 X10^3/UL (ref 4–11)

## 2022-05-05 RX ADMIN — DOCUSATE SODIUM SCH MG: 100 CAPSULE, LIQUID FILLED ORAL at 08:54

## 2022-05-05 RX ADMIN — Medication SCH CAP: at 08:54

## 2022-05-05 RX ADMIN — MORPHINE SULFATE SCH MG: 30 TABLET, EXTENDED RELEASE ORAL at 17:52

## 2022-05-05 RX ADMIN — POLYVINYL ALCOHOL SCH DROP: 14 SOLUTION/ DROPS OPHTHALMIC at 20:30

## 2022-05-05 RX ADMIN — DOCUSATE SODIUM SCH MG: 100 CAPSULE, LIQUID FILLED ORAL at 20:30

## 2022-05-05 RX ADMIN — GABAPENTIN SCH MG: 100 CAPSULE ORAL at 17:51

## 2022-05-05 RX ADMIN — GABAPENTIN SCH MG: 100 CAPSULE ORAL at 08:54

## 2022-05-05 RX ADMIN — PIPERACILLIN SODIUM AND TAZOBACTAM SODIUM SCH MLS/HR: 3; .375 INJECTION, POWDER, LYOPHILIZED, FOR SOLUTION INTRAVENOUS at 16:49

## 2022-05-05 RX ADMIN — PANTOPRAZOLE SODIUM SCH MG: 40 TABLET, DELAYED RELEASE ORAL at 20:30

## 2022-05-05 RX ADMIN — HYDROCODONE BITARTRATE AND ACETAMINOPHEN PRN TAB: 5; 325 TABLET ORAL at 20:30

## 2022-05-05 RX ADMIN — PIPERACILLIN SODIUM AND TAZOBACTAM SODIUM SCH MLS/HR: 3; .375 INJECTION, POWDER, LYOPHILIZED, FOR SOLUTION INTRAVENOUS at 21:13

## 2022-05-05 RX ADMIN — GABAPENTIN SCH MG: 100 CAPSULE ORAL at 20:30

## 2022-05-05 RX ADMIN — MORPHINE SULFATE SCH MG: 30 TABLET, EXTENDED RELEASE ORAL at 10:18

## 2022-05-05 RX ADMIN — PIPERACILLIN SODIUM AND TAZOBACTAM SODIUM SCH MLS/HR: 3; .375 INJECTION, POWDER, LYOPHILIZED, FOR SOLUTION INTRAVENOUS at 10:19

## 2022-05-05 RX ADMIN — VANCOMYCIN HYDROCHLORIDE SCH MLS/HR: 1 INJECTION, POWDER, LYOPHILIZED, FOR SOLUTION INTRAVENOUS at 05:24

## 2022-05-05 RX ADMIN — PIPERACILLIN SODIUM AND TAZOBACTAM SODIUM SCH MLS/HR: 3; .375 INJECTION, POWDER, LYOPHILIZED, FOR SOLUTION INTRAVENOUS at 04:00

## 2022-05-05 RX ADMIN — GABAPENTIN SCH MG: 100 CAPSULE ORAL at 13:36

## 2022-05-05 RX ADMIN — TRIAMCINOLONE ACETONIDE SCH APP: 5 CREAM TOPICAL at 08:54

## 2022-05-05 RX ADMIN — POLYVINYL ALCOHOL SCH DROP: 14 SOLUTION/ DROPS OPHTHALMIC at 08:54

## 2022-05-05 RX ADMIN — TRIAMCINOLONE ACETONIDE SCH APP: 5 CREAM TOPICAL at 20:30

## 2022-05-05 RX ADMIN — VANCOMYCIN HYDROCHLORIDE SCH MLS/HR: 1 INJECTION, POWDER, LYOPHILIZED, FOR SOLUTION INTRAVENOUS at 17:52

## 2022-05-05 RX ADMIN — Medication SCH CAP: at 17:51

## 2022-05-05 NOTE — HP
DATE OF SERVICE: 05/05/2022

ADMIT DATE: 05/04/2022

HISTORY OF PRESENT ILLNESS:  A 77-year-old male with history of lung cancer and 

possible with mets. The patient was brought in through the Emergency Room.  The 

patient apparently is having difficulty with mobilization and getting weaker, 

had a temperature of 101 degrees.  He came in through the Emergency Room and was

noted to have an elevated temperature and there was a dense consolidation in 

left lower lobe with pleural thickening.  The patient has chronic gastritis as 

well.  The patient was admitted for the IV antibiotic therapy.  His white count 

was 16,000 and as noted, his temperature was 101.1 when he came in with a pulse 

of 133, so technically he had sepsis.  He was treated for such. History is that 

of reports orthopedic surgery, cancer of the lung with mass, GERD, 

hypercholesterolemia, coronary artery disease, history of heart failure, family 

history of breast and arthritis problems.



ALLERGIES:  ADHESIVE TAPE.



SOCIAL HISTORY:  The patient had a distant history of smoking; however, denies 

any recent smoking here in the last 10 years.  The patient is a DNR.



REVIEW OF SYSTEMS:  Outside of his shortness of breath, generalized weakness, 

the patient in turn does have some nausea, vomiting and falling quite a bit, no 

balance.  Whether or not, we did a CT scan would be, I think, nonproductive 

there.  The patient denies any abdominal pain.  Denies any melena, hematochezia 

or hematemesis and neurologically, the patient is stable in that regard.



PHYSICAL EXAMINATION:

GENERAL:  The patient, on exam is a very pleasant gentleman.

VITAL SIGNS:  Initial blood pressure 120/90, respiratory rate 24, pulse 130, 

temperature 101.1 and 96% on room air.

HEENT:  The patient's head was atraumatic, normocephalic.  Eyes:  PERRLA without

jaundice.  The patient has somewhat of a drawn appearance, becoming more and 

more cachectic, not able to eat much.

NECK:  Fairly stiff because of his previous neck surgery.

LUNGS:  Diminished, primarily in the left lower lobe, marked decreased breath 

sounds.

CARDIOVASCULAR:  Tachycardic and showed tachycardic rhythm.

ABDOMEN:  Soft, protuberant, diffuse tenderness, but no rebound or guarding.  

Positive bowel sounds.

EXTREMITIES:  No clubbing, cyanosis, nor edema.

NEUROLOGIC:  The patient is alert.  Speech is somewhat slow.  The patient has 

complained of multiple areas of pain and as noted he has metastatic cancer and 

with bone metastasis.  He has been treated down at  for his lung cancer, but 

they have decided that further treatment would only maybe prolong his life by a 

few months if that at all.  Otherwise, the patient is resting comfortably.



ASSESSMENT AND PLAN:  Sepsis, pneumonia and lung cancer with metastatic disease,

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  Continue

on IV antibiotic therapy, which seems to be helping him as well as try 

nutritional support as well.  Also, moderate protein malnutrition.  He has a 

history of type 2 diabetes, but since he has not been eating much, he has also 

been having low blood sugars, hypoglycemic secondary to medications, so we have 

taken him off most of his medications in that regard.  We will continue to 

monitor him accordingly.  Continue IV antibiotic therapy.







EMA/FER/KATHY

DR: Sunil   DD: 05/05/2022 09:47

DT: 05/05/2022 10:06   TID: [id number]

## 2022-05-06 VITALS — SYSTOLIC BLOOD PRESSURE: 106 MMHG | DIASTOLIC BLOOD PRESSURE: 70 MMHG

## 2022-05-06 VITALS — SYSTOLIC BLOOD PRESSURE: 105 MMHG | DIASTOLIC BLOOD PRESSURE: 63 MMHG

## 2022-05-06 VITALS — SYSTOLIC BLOOD PRESSURE: 119 MMHG | DIASTOLIC BLOOD PRESSURE: 66 MMHG

## 2022-05-06 VITALS — SYSTOLIC BLOOD PRESSURE: 110 MMHG | DIASTOLIC BLOOD PRESSURE: 64 MMHG

## 2022-05-06 VITALS — DIASTOLIC BLOOD PRESSURE: 67 MMHG | SYSTOLIC BLOOD PRESSURE: 102 MMHG

## 2022-05-06 LAB — VANC TR: 15.3 MCG/ML (ref 10–20)

## 2022-05-06 RX ADMIN — POLYVINYL ALCOHOL SCH DROP: 14 SOLUTION/ DROPS OPHTHALMIC at 09:21

## 2022-05-06 RX ADMIN — PIPERACILLIN SODIUM AND TAZOBACTAM SODIUM SCH MLS/HR: 3; .375 INJECTION, POWDER, LYOPHILIZED, FOR SOLUTION INTRAVENOUS at 04:34

## 2022-05-06 RX ADMIN — GABAPENTIN SCH MG: 100 CAPSULE ORAL at 20:28

## 2022-05-06 RX ADMIN — MORPHINE SULFATE SCH MG: 30 TABLET, EXTENDED RELEASE ORAL at 18:47

## 2022-05-06 RX ADMIN — MORPHINE SULFATE SCH MG: 30 TABLET, EXTENDED RELEASE ORAL at 01:45

## 2022-05-06 RX ADMIN — GABAPENTIN SCH MG: 100 CAPSULE ORAL at 16:38

## 2022-05-06 RX ADMIN — TRIAMCINOLONE ACETONIDE SCH APP: 5 CREAM TOPICAL at 20:29

## 2022-05-06 RX ADMIN — PIPERACILLIN SODIUM AND TAZOBACTAM SODIUM SCH MLS/HR: 3; .375 INJECTION, POWDER, LYOPHILIZED, FOR SOLUTION INTRAVENOUS at 16:32

## 2022-05-06 RX ADMIN — VANCOMYCIN HYDROCHLORIDE SCH MLS/HR: 1 INJECTION, POWDER, LYOPHILIZED, FOR SOLUTION INTRAVENOUS at 05:38

## 2022-05-06 RX ADMIN — DOCUSATE SODIUM SCH MG: 100 CAPSULE, LIQUID FILLED ORAL at 09:20

## 2022-05-06 RX ADMIN — DOCUSATE SODIUM SCH MG: 100 CAPSULE, LIQUID FILLED ORAL at 20:28

## 2022-05-06 RX ADMIN — GABAPENTIN SCH MG: 100 CAPSULE ORAL at 09:20

## 2022-05-06 RX ADMIN — DOCUSATE SODIUM SCH MG: 100 CAPSULE, LIQUID FILLED ORAL at 21:00

## 2022-05-06 RX ADMIN — MORPHINE SULFATE SCH MG: 30 TABLET, EXTENDED RELEASE ORAL at 11:56

## 2022-05-06 RX ADMIN — GABAPENTIN SCH MG: 100 CAPSULE ORAL at 16:31

## 2022-05-06 RX ADMIN — PANTOPRAZOLE SODIUM SCH MG: 40 TABLET, DELAYED RELEASE ORAL at 20:29

## 2022-05-06 RX ADMIN — Medication SCH CAP: at 09:20

## 2022-05-06 RX ADMIN — Medication SCH CAP: at 16:38

## 2022-05-06 RX ADMIN — VANCOMYCIN HYDROCHLORIDE SCH MLS/HR: 1 INJECTION, POWDER, LYOPHILIZED, FOR SOLUTION INTRAVENOUS at 17:20

## 2022-05-06 RX ADMIN — TRIAMCINOLONE ACETONIDE SCH APP: 5 CREAM TOPICAL at 09:20

## 2022-05-06 RX ADMIN — PIPERACILLIN SODIUM AND TAZOBACTAM SODIUM SCH MLS/HR: 3; .375 INJECTION, POWDER, LYOPHILIZED, FOR SOLUTION INTRAVENOUS at 11:55

## 2022-05-06 RX ADMIN — Medication SCH CAP: at 20:28

## 2022-05-06 RX ADMIN — POLYVINYL ALCOHOL SCH DROP: 14 SOLUTION/ DROPS OPHTHALMIC at 20:29

## 2022-05-06 RX ADMIN — PIPERACILLIN SODIUM AND TAZOBACTAM SODIUM SCH MLS/HR: 3; .375 INJECTION, POWDER, LYOPHILIZED, FOR SOLUTION INTRAVENOUS at 22:12

## 2022-05-06 NOTE — NUR
Pt has been pleasant and cooperative, cracking jokes with the staff.  He is uncomfortable 
and concerned about itching and pain medications.  Pt wife had many questions about timing 
and use of various medications.  Educated on medication use and timing and assured pt's wife 
we were monitoring reactions and comfort level.  Pt mostly able to feed self.  He struggles 
with setup and seasoning food.

## 2022-05-07 VITALS — SYSTOLIC BLOOD PRESSURE: 126 MMHG | DIASTOLIC BLOOD PRESSURE: 67 MMHG

## 2022-05-07 VITALS — SYSTOLIC BLOOD PRESSURE: 103 MMHG | DIASTOLIC BLOOD PRESSURE: 65 MMHG

## 2022-05-07 VITALS — DIASTOLIC BLOOD PRESSURE: 78 MMHG | SYSTOLIC BLOOD PRESSURE: 123 MMHG

## 2022-05-07 RX ADMIN — PIPERACILLIN SODIUM AND TAZOBACTAM SODIUM SCH MLS/HR: 3; .375 INJECTION, POWDER, LYOPHILIZED, FOR SOLUTION INTRAVENOUS at 04:14

## 2022-05-07 RX ADMIN — POLYVINYL ALCOHOL SCH DROP: 14 SOLUTION/ DROPS OPHTHALMIC at 21:00

## 2022-05-07 RX ADMIN — GABAPENTIN SCH MG: 100 CAPSULE ORAL at 08:39

## 2022-05-07 RX ADMIN — MORPHINE SULFATE SCH MG: 30 TABLET, EXTENDED RELEASE ORAL at 01:45

## 2022-05-07 RX ADMIN — DOCUSATE SODIUM SCH MG: 100 CAPSULE, LIQUID FILLED ORAL at 08:38

## 2022-05-07 RX ADMIN — PIPERACILLIN SODIUM AND TAZOBACTAM SODIUM SCH MLS/HR: 3; .375 INJECTION, POWDER, LYOPHILIZED, FOR SOLUTION INTRAVENOUS at 16:09

## 2022-05-07 RX ADMIN — TRIAMCINOLONE ACETONIDE SCH APP: 5 CREAM TOPICAL at 08:38

## 2022-05-07 RX ADMIN — DOCUSATE SODIUM SCH MG: 100 CAPSULE, LIQUID FILLED ORAL at 21:00

## 2022-05-07 RX ADMIN — POLYVINYL ALCOHOL SCH DROP: 14 SOLUTION/ DROPS OPHTHALMIC at 08:38

## 2022-05-07 RX ADMIN — VANCOMYCIN HYDROCHLORIDE SCH MLS/HR: 1 INJECTION, POWDER, LYOPHILIZED, FOR SOLUTION INTRAVENOUS at 17:00

## 2022-05-07 RX ADMIN — GABAPENTIN SCH MG: 100 CAPSULE ORAL at 13:00

## 2022-05-07 RX ADMIN — Medication SCH CAP: at 17:00

## 2022-05-07 RX ADMIN — PIPERACILLIN SODIUM AND TAZOBACTAM SODIUM SCH MLS/HR: 3; .375 INJECTION, POWDER, LYOPHILIZED, FOR SOLUTION INTRAVENOUS at 09:55

## 2022-05-07 RX ADMIN — VANCOMYCIN HYDROCHLORIDE SCH MLS/HR: 1 INJECTION, POWDER, LYOPHILIZED, FOR SOLUTION INTRAVENOUS at 05:07

## 2022-05-07 RX ADMIN — MORPHINE SULFATE SCH MG: 30 TABLET, EXTENDED RELEASE ORAL at 17:23

## 2022-05-07 RX ADMIN — MORPHINE SULFATE SCH MG: 30 TABLET, EXTENDED RELEASE ORAL at 08:39

## 2022-05-07 RX ADMIN — TRIAMCINOLONE ACETONIDE SCH APP: 5 CREAM TOPICAL at 21:00

## 2022-05-07 RX ADMIN — GABAPENTIN SCH MG: 100 CAPSULE ORAL at 21:00

## 2022-05-07 RX ADMIN — GABAPENTIN SCH MG: 100 CAPSULE ORAL at 17:00

## 2022-05-07 RX ADMIN — PANTOPRAZOLE SODIUM SCH MG: 40 TABLET, DELAYED RELEASE ORAL at 21:00

## 2022-05-07 RX ADMIN — PIPERACILLIN SODIUM AND TAZOBACTAM SODIUM SCH MLS/HR: 3; .375 INJECTION, POWDER, LYOPHILIZED, FOR SOLUTION INTRAVENOUS at 22:00

## 2022-05-07 NOTE — NUR
Nursing note

PT sitting on the side of the bed, eating dinner. Family at bedside. Evening medications 
administered. PT scheduled pain medication non-administered when PT verbalized no pain and 
does not want any pain medication at this time. Bed low, call light within reach. PT 
verbalized no other needs.

## 2022-05-07 NOTE — PN
SUBJECTIVE:  A 77-year-old gentleman who has terminal lung cancer.  The 

patient's white count stayed elevated at 16,000.  He is on IV antibiotic therapy

for sepsis.  The patient is resting fairly comfortably; however, ____ quite 

weakened with his cancer.  He has marked decreased breath sounds in the left 

lower lobe, but he seems to be having a fairly good day.



OBJECTIVE:

GENERAL:  The patient is alert and oriented.

LUNGS:  Diminished throughout, but basically clear except for the left lower 

lobe, which shows marked diminishment of breath sounds.

CARDIOVASCULAR:  Stable.  Regular sinus rhythm.

ABDOMEN:  Soft, nontender, protuberant.

EXTREMITIES:  No clubbing, cyanosis, nor edema.

MUSCULOSKELETAL:  Generalized weakness from the cancer and generalized severe 

pain because of mets to his spine.



IMPRESSION:  Sepsis, pneumonia, lung cancer with mets, severe chronic pain 

secondary to metastatic cancer, leukocytosis, anemia of chronic disease, 

hyperglycemia, hypercalcemia secondary to the metastatic disease.  Continue pain

management and IV antibiotic therapy.







EMA/TRINA/LYNNETTE

DR: Sunil   DD: 05/06/2022 18:56

DT: 05/07/2022 13:10   TID: [id number]

## 2022-05-07 NOTE — NUR
Pt is non compliant this night. He refuses his assessment, vitals, oral medications, and IV 
antibiotics. Pt is angry and states, "What are you doing in here? Go away I do not want 
anything done and I don't care about my pneumonia."

## 2022-05-07 NOTE — NUR
Nursing note

PT standing by his bed, confused. Assisted to the restroom. Assisted back to bed. PT 
verbalized pain with no numeric value. Morning assessment done, medications adminintered per 
doctors orders including pain medication. PT assisted with meal try. Bed low, bed alarm on. 
Call light within reach. PT verbalized no other needs. Will continue to monitor.

## 2022-05-07 NOTE — NUR
Upon arrival to shift this nurse saw the patient at [facility] walking around the halls. When asked 
what he was doing pt states, "Well I'm looking for my wife. Where is she?" Pt did not have 
his oxygen on and became agitated and pushed this nurse out his room.

## 2022-05-08 VITALS — SYSTOLIC BLOOD PRESSURE: 146 MMHG | DIASTOLIC BLOOD PRESSURE: 83 MMHG

## 2022-05-08 VITALS — DIASTOLIC BLOOD PRESSURE: 77 MMHG | SYSTOLIC BLOOD PRESSURE: 129 MMHG

## 2022-05-08 LAB — VANC TR: 18.2 MCG/ML (ref 10–20)

## 2022-05-08 RX ADMIN — VANCOMYCIN HYDROCHLORIDE SCH MLS/HR: 1 INJECTION, POWDER, LYOPHILIZED, FOR SOLUTION INTRAVENOUS at 23:34

## 2022-05-08 RX ADMIN — POLYVINYL ALCOHOL SCH DROP: 14 SOLUTION/ DROPS OPHTHALMIC at 20:26

## 2022-05-08 RX ADMIN — MORPHINE SULFATE SCH MG: 30 TABLET, EXTENDED RELEASE ORAL at 01:45

## 2022-05-08 RX ADMIN — TRIAMCINOLONE ACETONIDE SCH APP: 5 CREAM TOPICAL at 20:58

## 2022-05-08 RX ADMIN — PIPERACILLIN SODIUM AND TAZOBACTAM SODIUM SCH MLS/HR: 3; .375 INJECTION, POWDER, LYOPHILIZED, FOR SOLUTION INTRAVENOUS at 22:39

## 2022-05-08 RX ADMIN — DOCUSATE SODIUM SCH MG: 100 CAPSULE, LIQUID FILLED ORAL at 20:26

## 2022-05-08 RX ADMIN — MORPHINE SULFATE SCH MG: 30 TABLET, EXTENDED RELEASE ORAL at 10:03

## 2022-05-08 RX ADMIN — CETIRIZINE HYDROCHLORIDE SCH MG: 10 TABLET, FILM COATED ORAL at 10:00

## 2022-05-08 RX ADMIN — PIPERACILLIN SODIUM AND TAZOBACTAM SODIUM SCH MLS/HR: 3; .375 INJECTION, POWDER, LYOPHILIZED, FOR SOLUTION INTRAVENOUS at 15:47

## 2022-05-08 RX ADMIN — PIPERACILLIN SODIUM AND TAZOBACTAM SODIUM SCH MLS/HR: 3; .375 INJECTION, POWDER, LYOPHILIZED, FOR SOLUTION INTRAVENOUS at 10:00

## 2022-05-08 RX ADMIN — DOCUSATE SODIUM SCH MG: 100 CAPSULE, LIQUID FILLED ORAL at 10:00

## 2022-05-08 RX ADMIN — GABAPENTIN SCH MG: 100 CAPSULE ORAL at 09:59

## 2022-05-08 RX ADMIN — PIPERACILLIN SODIUM AND TAZOBACTAM SODIUM SCH MLS/HR: 3; .375 INJECTION, POWDER, LYOPHILIZED, FOR SOLUTION INTRAVENOUS at 06:10

## 2022-05-08 RX ADMIN — PIPERACILLIN SODIUM AND TAZOBACTAM SODIUM SCH MLS/HR: 3; .375 INJECTION, POWDER, LYOPHILIZED, FOR SOLUTION INTRAVENOUS at 04:00

## 2022-05-08 RX ADMIN — Medication SCH CAP: at 15:44

## 2022-05-08 RX ADMIN — GABAPENTIN SCH MG: 100 CAPSULE ORAL at 15:43

## 2022-05-08 RX ADMIN — PANTOPRAZOLE SODIUM SCH MG: 40 TABLET, DELAYED RELEASE ORAL at 20:26

## 2022-05-08 RX ADMIN — Medication SCH CAP: at 09:59

## 2022-05-08 RX ADMIN — VANCOMYCIN HYDROCHLORIDE SCH MLS/HR: 1 INJECTION, POWDER, LYOPHILIZED, FOR SOLUTION INTRAVENOUS at 05:00

## 2022-05-08 RX ADMIN — MORPHINE SULFATE SCH MG: 30 TABLET, EXTENDED RELEASE ORAL at 15:43

## 2022-05-08 RX ADMIN — VANCOMYCIN HYDROCHLORIDE SCH MLS/HR: 1 INJECTION, POWDER, LYOPHILIZED, FOR SOLUTION INTRAVENOUS at 11:28

## 2022-05-08 RX ADMIN — POLYVINYL ALCOHOL SCH DROP: 14 SOLUTION/ DROPS OPHTHALMIC at 09:59

## 2022-05-08 RX ADMIN — DOCUSATE SODIUM SCH MG: 100 CAPSULE, LIQUID FILLED ORAL at 20:59

## 2022-05-08 RX ADMIN — TRIAMCINOLONE ACETONIDE SCH APP: 5 CREAM TOPICAL at 10:00

## 2022-05-08 RX ADMIN — GABAPENTIN SCH MG: 100 CAPSULE ORAL at 20:26

## 2022-05-08 NOTE — NUR
Pt got out of room, had his oxygen tubing ripped in half, and wanting a bag so he could "get 
out of this place." This nurse tried to redirect patient and he states, "I don't care who 
you think you are Savannah W but you need to get out of my way." The nursing supervisor came up 
to him and he was easily redirected by her. Got patient back into his room and agreed to 
antibiotics.

## 2022-05-09 VITALS — DIASTOLIC BLOOD PRESSURE: 68 MMHG | SYSTOLIC BLOOD PRESSURE: 103 MMHG

## 2022-05-09 VITALS — SYSTOLIC BLOOD PRESSURE: 102 MMHG | DIASTOLIC BLOOD PRESSURE: 50 MMHG

## 2022-05-09 VITALS — SYSTOLIC BLOOD PRESSURE: 115 MMHG | DIASTOLIC BLOOD PRESSURE: 67 MMHG

## 2022-05-09 LAB
ANION GAP SERPL CALC-SCNC: 7 MMOL/L (ref 6–14)
BASOPHILS # BLD AUTO: 0.1 X10^3/UL (ref 0–0.2)
BASOPHILS NFR BLD: 1 % (ref 0–3)
CA-I SERPL ISE-MCNC: 11 MG/DL (ref 8–26)
CALCIUM SERPL-MCNC: 10 MG/DL (ref 8.5–10.1)
CHLORIDE SERPL-SCNC: 102 MMOL/L (ref 98–107)
CO2 SERPL-SCNC: 29 MMOL/L (ref 21–32)
CREAT SERPL-MCNC: 0.7 MG/DL (ref 0.7–1.3)
EOSINOPHIL NFR BLD: 0.8 X10^3/UL (ref 0–0.7)
EOSINOPHIL NFR BLD: 9 % (ref 0–3)
ERYTHROCYTE [DISTWIDTH] IN BLOOD BY AUTOMATED COUNT: 13.6 % (ref 11.5–14.5)
GFR SERPLBLD BASED ON 1.73 SQ M-ARVRAT: 109.1 ML/MIN
GLUCOSE SERPL-MCNC: 90 MG/DL (ref 70–99)
HCT VFR BLD CALC: 33 % (ref 39–53)
HGB BLD-MCNC: 10.8 G/DL (ref 13–17.5)
LYMPHOCYTES # BLD: 1.1 X10^3/UL (ref 1–4.8)
LYMPHOCYTES NFR BLD AUTO: 12 % (ref 24–48)
MCH RBC QN AUTO: 28 PG (ref 25–35)
MCHC RBC AUTO-ENTMCNC: 33 G/DL (ref 31–37)
MCV RBC AUTO: 87 FL (ref 79–100)
MONO #: 1.4 X10^3/UL (ref 0–1.1)
MONOCYTES NFR BLD: 15 % (ref 0–9)
NEUT #: 6 X10^3UL (ref 1.8–7.7)
NEUTROPHILS NFR BLD AUTO: 64 % (ref 31–73)
PLATELET # BLD AUTO: 236 X10^3/UL (ref 140–400)
POTASSIUM SERPL-SCNC: 4 MMOL/L (ref 3.5–5.1)
RBC # BLD AUTO: 3.78 X10^6/UL (ref 4.3–5.7)
SODIUM SERPL-SCNC: 138 MMOL/L (ref 136–145)
WBC # BLD AUTO: 9.4 X10^3/UL (ref 4–11)

## 2022-05-09 RX ADMIN — CETIRIZINE HYDROCHLORIDE SCH MG: 10 TABLET, FILM COATED ORAL at 09:41

## 2022-05-09 RX ADMIN — Medication SCH CAP: at 09:41

## 2022-05-09 RX ADMIN — MORPHINE SULFATE SCH MG: 30 TABLET, EXTENDED RELEASE ORAL at 09:41

## 2022-05-09 RX ADMIN — MORPHINE SULFATE SCH MG: 30 TABLET, EXTENDED RELEASE ORAL at 01:45

## 2022-05-09 RX ADMIN — DOCUSATE SODIUM SCH MG: 100 CAPSULE, LIQUID FILLED ORAL at 09:41

## 2022-05-09 RX ADMIN — VANCOMYCIN HYDROCHLORIDE SCH MLS/HR: 1 INJECTION, POWDER, LYOPHILIZED, FOR SOLUTION INTRAVENOUS at 12:08

## 2022-05-09 RX ADMIN — GABAPENTIN SCH MG: 100 CAPSULE ORAL at 09:41

## 2022-05-09 RX ADMIN — HYDROCODONE BITARTRATE AND ACETAMINOPHEN PRN TAB: 5; 325 TABLET ORAL at 12:07

## 2022-05-09 RX ADMIN — GABAPENTIN SCH MG: 100 CAPSULE ORAL at 15:31

## 2022-05-09 RX ADMIN — PIPERACILLIN SODIUM AND TAZOBACTAM SODIUM SCH MLS/HR: 3; .375 INJECTION, POWDER, LYOPHILIZED, FOR SOLUTION INTRAVENOUS at 04:04

## 2022-05-09 RX ADMIN — TRIAMCINOLONE ACETONIDE SCH APP: 5 CREAM TOPICAL at 09:00

## 2022-05-09 RX ADMIN — PIPERACILLIN SODIUM AND TAZOBACTAM SODIUM SCH MLS/HR: 3; .375 INJECTION, POWDER, LYOPHILIZED, FOR SOLUTION INTRAVENOUS at 09:42

## 2022-05-09 RX ADMIN — POLYVINYL ALCOHOL SCH DROP: 14 SOLUTION/ DROPS OPHTHALMIC at 09:41

## 2022-05-09 NOTE — RAD
PA and lateral chest radiographs 5/9/2022



CLINICAL HISTORY: Pneumonia.



PA and lateral digital radiographs of chest were obtained. Comparison study is dated 5/4/2022. Anteri
or plate and bone screws within the mid/lower cervical vertebrae, unchanged. A right internal jugular
 central venous catheter is unchanged. The cardiac silhouette is mildly enlarged. Atherosclerotic arslan
cification thoracic aorta is seen. The thoracic aorta is tortuous. Left lower lobe atelectasis and/or
 infiltrate is again seen, unchanged. Left pleural thickening is again noted. No pneumothorax or pleu
ral effusion is seen. The osseous structures are unchanged.



IMPRESSION: Left lower lobe atelectasis and/or infiltrate, unchanged.



Electronically signed by: Jacky Pineda MD (5/9/2022 8:35 AM) [id number]

## 2022-05-09 NOTE — NUR
Pt ambulated independently to wheelchair then escorted to pov driven by wife.  Pt helped 
into vehicle.

## 2022-05-09 NOTE — PN
SUBJECTIVE:  The patient is a 77-year-old male with lung cancer, pneumonia in 

his left lower lobe, septic.  The patient has been treated with IV antibiotic 

therapy.  The patient has become somewhat agitated and had to use some ____in IV

through a port since he had been picking on his IV and destroyed ____.  The 

patient is becoming more and more confused, disoriented.  The use of Ativan 

seems to help in slow him down and hopefully we will repeat a chest x-ray and be

ready for discharge in the a.m.



IMPRESSION:  Sepsis, lung cancer, pneumonia, metastatic disease ____ lung, 

severe pain, hyperglycemia, hypercalcemia secondary to the metastatic bone 

cancer.



PLAN:  We will keep him as comfortable as possible and make further evaluation 

on him.  Hopefully, ready for discharge home and hospice care awaiting.







EMA/REHAN/KAYLA

DR: Sunil   DD: 05/08/2022 22:21

DT: 05/09/2022 04:12   TID: [id number]

## 2022-05-09 NOTE — NUR
Pt is extremely independent. Pt does not like help from anyone. Pt did however let this 
nurse help him get up from the toilet.

## 2022-05-09 NOTE — PN
DATE: 05/07/2022

SUBJECTIVE:  The patient in with sepsis as a left lower lobe pneumonia, seems to

be doing a little bit better and will be on continuous oxygen.  The patient 

still has itching, probably from his lung cancer, tried various medications to 

control that.  Other than that, seems to be breathing a little bit easier.



OBJECTIVE:

VITAL SIGNS:  Blood pressure 123/78, respiratory rate 18, pulse 75, afebrile, 2 

liters at 92.

GENERAL:  The patient is alert and oriented, baseline, a little bit groggy.

LUNGS:  Diminished, primarily in the left lower lung bases.

CARDIOVASCULAR:  Regular sinus rhythm.

ABDOMEN:  Soft, nontender.



IMPRESSION:  Therefore, sepsis, pneumonia, severe protein malnutrition, lung 

cancer.



PLAN:  Continue with present drug regimen and make further evaluation on him as 

indicated.







EMA/RONEN/ERNESTINA

DR: EMA/imtiaz   DD: 05/07/2022 10:46

DT: 05/08/2022 00:28   TID: [id number]

## 2022-05-11 ENCOUNTER — HOSPITAL ENCOUNTER (OUTPATIENT)
Dept: HOSPITAL 63 - ER | Age: 77
Setting detail: OBSERVATION
LOS: 2 days | Discharge: HOSPICE | End: 2022-05-13
Attending: FAMILY MEDICINE | Admitting: FAMILY MEDICINE
Payer: MEDICARE

## 2022-05-11 VITALS — SYSTOLIC BLOOD PRESSURE: 110 MMHG | DIASTOLIC BLOOD PRESSURE: 68 MMHG

## 2022-05-11 VITALS — BODY MASS INDEX: 24.86 KG/M2 | HEIGHT: 68 IN | WEIGHT: 164.02 LBS

## 2022-05-11 DIAGNOSIS — Z98.890: ICD-10-CM

## 2022-05-11 DIAGNOSIS — G12.9: ICD-10-CM

## 2022-05-11 DIAGNOSIS — Z79.899: ICD-10-CM
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
## 2022-05-11 DIAGNOSIS — I25.10: ICD-10-CM
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
## 2022-05-11 DIAGNOSIS — Z85.118: ICD-10-CM

## 2022-05-11 DIAGNOSIS — M25.552: ICD-10-CM

## 2022-05-11 DIAGNOSIS — J18.9: ICD-10-CM

## 2022-05-11 DIAGNOSIS — C34.92: ICD-10-CM

## 2022-05-11 DIAGNOSIS — K21.9: ICD-10-CM
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
## 2022-05-11 DIAGNOSIS — R29.6: ICD-10-CM

## 2022-05-11 DIAGNOSIS — Z66: ICD-10-CM

## 2022-05-11 DIAGNOSIS — S00.93XA: Primary | ICD-10-CM

## 2022-05-11 DIAGNOSIS — E43: ICD-10-CM

## 2022-05-11 DIAGNOSIS — E83.52: ICD-10-CM

## 2022-05-11 DIAGNOSIS — E78.00: ICD-10-CM

## 2022-05-11 DIAGNOSIS — R53.1: ICD-10-CM

## 2022-05-11 DIAGNOSIS — I50.9: ICD-10-CM

## 2022-05-11 DIAGNOSIS — Z87.01: ICD-10-CM

## 2022-05-11 DIAGNOSIS — Z87.891: ICD-10-CM
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
## 2022-05-11 DIAGNOSIS — G89.29: ICD-10-CM

## 2022-05-11 LAB
ALBUMIN SERPL-MCNC: 2.4 G/DL (ref 3.4–5)
ALBUMIN/GLOB SERPL: 0.7 {RATIO} (ref 1–1.7)
ALP SERPL-CCNC: 57 U/L (ref 46–116)
ALT SERPL-CCNC: 20 U/L (ref 16–63)
ANION GAP SERPL CALC-SCNC: 7 MMOL/L (ref 6–14)
AST SERPL-CCNC: 47 U/L (ref 15–37)
BASOPHILS # BLD AUTO: 0 X10^3/UL (ref 0–0.2)
BASOPHILS NFR BLD: 0 % (ref 0–3)
BILIRUB SERPL-MCNC: 0.4 MG/DL (ref 0.2–1)
BUN/CREAT SERPL: 23 (ref 6–20)
CA-I SERPL ISE-MCNC: 18 MG/DL (ref 8–26)
CALCIUM SERPL-MCNC: 10.7 MG/DL (ref 8.5–10.1)
CHLORIDE SERPL-SCNC: 101 MMOL/L (ref 98–107)
CO2 SERPL-SCNC: 28 MMOL/L (ref 21–32)
CREAT SERPL-MCNC: 0.8 MG/DL (ref 0.7–1.3)
EOSINOPHIL NFR BLD: 0.5 X10^3/UL (ref 0–0.7)
EOSINOPHIL NFR BLD: 4 % (ref 0–3)
ERYTHROCYTE [DISTWIDTH] IN BLOOD BY AUTOMATED COUNT: 13.4 % (ref 11.5–14.5)
GFR SERPLBLD BASED ON 1.73 SQ M-ARVRAT: 93.5 ML/MIN
GLOBULIN SER-MCNC: 3.6 G/DL (ref 2.2–3.8)
GLUCOSE SERPL-MCNC: 98 MG/DL (ref 70–99)
HCT VFR BLD CALC: 35.2 % (ref 39–53)
HGB BLD-MCNC: 11.3 G/DL (ref 13–17.5)
LYMPHOCYTES # BLD: 1.4 X10^3/UL (ref 1–4.8)
LYMPHOCYTES NFR BLD AUTO: 11 % (ref 24–48)
MCH RBC QN AUTO: 28 PG (ref 25–35)
MCHC RBC AUTO-ENTMCNC: 32 G/DL (ref 31–37)
MCV RBC AUTO: 87 FL (ref 79–100)
MONO #: 1.5 X10^3/UL (ref 0–1.1)
MONOCYTES NFR BLD: 12 % (ref 0–9)
NEUT #: 9.2 X10^3UL (ref 1.8–7.7)
NEUTROPHILS NFR BLD AUTO: 73 % (ref 31–73)
PLATELET # BLD AUTO: 310 X10^3/UL (ref 140–400)
POTASSIUM SERPL-SCNC: 4.5 MMOL/L (ref 3.5–5.1)
PROT SERPL-MCNC: 6 G/DL (ref 6.4–8.2)
RBC # BLD AUTO: 4.06 X10^6/UL (ref 4.3–5.7)
SODIUM SERPL-SCNC: 136 MMOL/L (ref 136–145)
WBC # BLD AUTO: 12.7 X10^3/UL (ref 4–11)

## 2022-05-11 PROCEDURE — G0378 HOSPITAL OBSERVATION PER HR: HCPCS

## 2022-05-11 PROCEDURE — 73502 X-RAY EXAM HIP UNI 2-3 VIEWS: CPT

## 2022-05-11 PROCEDURE — 36415 COLL VENOUS BLD VENIPUNCTURE: CPT

## 2022-05-11 PROCEDURE — G0379 DIRECT REFER HOSPITAL OBSERV: HCPCS

## 2022-05-11 PROCEDURE — 96374 THER/PROPH/DIAG INJ IV PUSH: CPT

## 2022-05-11 PROCEDURE — 99285 EMERGENCY DEPT VISIT HI MDM: CPT

## 2022-05-11 PROCEDURE — 80053 COMPREHEN METABOLIC PANEL: CPT

## 2022-05-11 PROCEDURE — 85025 COMPLETE CBC W/AUTO DIFF WBC: CPT

## 2022-05-11 PROCEDURE — 96376 TX/PRO/DX INJ SAME DRUG ADON: CPT

## 2022-05-11 PROCEDURE — 72192 CT PELVIS W/O DYE: CPT

## 2022-05-11 RX ADMIN — MORPHINE SULFATE PRN MG: 2 INJECTION, SOLUTION INTRAMUSCULAR; INTRAVENOUS at 22:36

## 2022-05-11 NOTE — PHYS DOC
Past History


Additional Past Medical Histor:  Lung Cancer


 (NEGRA BOYER)


Past Surgical History:  Cervical Fusion, Tonsillectomy


 (NEGRA BOYER)


Alcohol Use:  None


 (NEGRA BOYER)





General Adult


EDM:


Chief Complaint:  HIP PAIN





HPI:


HPI:





Patient is a 77-year-old male who presents to the emergency department following

a fall that occurred this afternoon.  Patient reports that he lost his balance 

and fell and was helped up by nursing staff.  Patient denies hitting his head or

loss of consciousness.  He denies any new neck or back pain but reports chronic 

back pain.  Patient denies any saddle anesthesias.  He was given Norco for his 

pain and reports that that did improve his pain.  He states the reason he called

the EMS for transport is he went to the bathroom prior to ER arrival and noticed

some pain while sitting.


 (NEGRA BOYER)





Review of Systems:


Review of Systems:





HENT: See HPI


Musculoskeletal: See HPI


Integument: See HPI


Neurologic: See HPI


 (NEGRA BOYER)





Allergies:


Allergies:





Allergies








Coded Allergies Type Severity Reaction Last Updated Verified


 


  adhesive tape Allergy Unknown  1/5/22 Yes








 (NEGRA BOYER)





Physical Exam:


PE:





Constitutional: Well developed, well nourished, no acute distress, non-toxic 

appearance. []


HENT: Normocephalic, atraumatic, bilateral external ears normal, oropharynx 

moist, no oral exudates, nose normal. []


Eyes: PERRL, EOMI, conjunctiva normal, no discharge. [] 


Neck: Normal range of motion, no bony spinal tenderness, supple, no stridor. [] 


Cardiovascular:Heart rate regular rhythm, no murmur []


Lungs & Thorax:  Bilateral breath sounds clear to auscultation []


Abdomen: Bowel sounds normal, soft, no tenderness, no masses, no pulsatile 

masses. [] 


Skin: Warm, dry, no erythema, no rash. [] 


Back: No tenderness, normal range of motion


Extremities: No tenderness, no cyanosis, no clubbing, ROM intact, no edema. [] 

No shortening or rotation of left leg, range of motion intact, neuro intact


Neurologic: Alert and oriented X 3, normal motor function, normal sensory 

function, no focal deficits noted. []


Psychologic: Affect normal, judgement normal, mood normal. []


 (NEGRA BOYER)





Current Patient Data:


Vital Signs:





                                   Vital Signs








  Date Time  Temp Pulse Resp B/P (MAP) Pulse Ox O2 Delivery O2 Flow Rate FiO2


 


5/11/22 18:01  92 18 102/87 (92) 99   








 (NEGRA BOYER)





EKG:


EKG:


[]


 (NEGRA BOYER)





Radiology/Procedures:


Radiology/Procedures:


[]PROCEDURE: CT PELVIS WO CONTRAST





EXAMINATION: CT PELVIS WITHOUT IV CONTRAST





CLINICAL HISTORY: Pelvic and left hip pain following fall.





TECHNIQUE: Noncontrast serial axial images obtained through the bony pelvis with

 sagittal and coronal reconstructions.





CT Dose Reduction Employed: One or more of the following individualized dose 

reduction techniques were utilized for this examination:  1. Automated exposure 

control  2. Adjustment of the mA and/or kV according to patient size  3. Use of 

iterative reconstruction technique.





COMPARISON: Left hip radiographs 5/11/2022, CT chest/abdomen/pelvis 5/4/2022








FINDINGS:  





No evidence of acute fracture. Mild degenerative changes bilateral hips. Minimal

 degenerative changes bilateral SI joints. Pubic symphysis within normal limits.

 Partially visualized lumbar degenerative changes. Redemonstration of 

nonspecific 1.5 cm lytic focus in the L3 vertebral body. Nonspecific 8 mm 

sclerotic focus in the right iliac wing, possibly a bone island.





Tendinosis bilateral hamstring origins, incompletely evaluated. Mild gluteal 

insertional tendinosis bilaterally, incompletely evaluated. Tendons and muscles 

otherwise unremarkable on limited evaluation.





Mild asymmetric subcutaneous edema along the left flank. Vascular 

calcifications. Marked stool throughout the partially visualized colon, more 

pronounced compared to the prior study.








IMPRESSION:  





No evidence of acute fracture.





Mild degenerative changes bilateral hips.





Partially visualized marked colonic stool incidentally noted, correlate for 

constipation.





Multiple additional nonacute findings as described.





Electronically signed by: Robb Coto DO (5/11/2022 8:22 PM) [facility]














DICTATED AND SIGNED BY:     ROBB COTO DO


DATE:     05/11/22 2012





CC: MEHDI ROSALES MD; NEGRA BOYER ~


 (NEGRA BOYER)





Heart Score:


C/O Chest Pain:  N/A


Risk Factors:


Risk Factors:  DM, Current or recent (<one month) smoker, HTN, HLP, family 

history of CAD, obesity.


Risk Scores:


Score 0 - 3:  2.5% MACE over next 6 weeks - Discharge Home


Score 4 - 6:  20.3% MACE over next 6 weeks - Admit for Clinical Observation


Score 7 - 10:  72.7% MACE over next 6 weeks - Early Invasive Strategies


 (NEGRA BOYER)





Course & Med Decision Making:


Course & Med Decision Making


Pertinent Labs and Imaging studies reviewed. (See chart for details)





[] Patient presents to the emergency department following a fall.  Patient 

reports he lost his balance and fell down this afternoon.  He is reporting left 

hip pain.  Patient has been able to bear weight and ambulate.  He denies any 

head or neck pain.  Denies any head injury or loss of consciousness.  Imaging w

as performed.  They were negative for any acute fractures.  ER RN notified me 

that patient is unable to bear weight on his own.  She reports that he is very 

weak.  Patient resides at home with his wife and she does not believe that she 

is capable of taking care of him.  She would like to have him admitted for 

possible rehab or nursing home placement.  I discussed this with Dr. Mercedes 

who is patient's primary care provider.  He agreed to admit the patient under 

his services.


 (NEGRA BOYER)





Dragon Disclaimer:


Dragon Disclaimer:


This electronic medical record was generated, in whole or in part, using a voice

 recognition dictation system.


 (NEGRA BOYER)





Departure


Departure:


Impression:  


   Primary Impression:  


   Hip pain


   Additional Impression:  


   Unable to care for self


Disposition:  09 ADMITTED AS INPATIENT


Condition:  STABLE


Referrals:  


MEHDI ROSALES MD (PCP)





Dragon Disclaimer


This chart was dictated in whole or in part using Voice Recognition software in 

a busy, high-work load, and often noisy Emergency Department environment.  It 

may contain unintended and wholly unrecognized errors or omissions.


 (FREDDY DEWITT MD)





Attending Signature


Attending Signature


I have participated in the care of this patient and I have reviewed and agree 

with all pertinent clinical information above including history, exam, and 

recommendations.





 (FREDDY DEWITT MD)











NEGRA BOYER          May 11, 2022 18:08


FREDDY DEWITT MD           May 13, 2022 18:00

## 2022-05-11 NOTE — RAD
EXAMINATION: XR LT HIP (WITH OR WITHOUT PELVIS) 2 VIEWS  



CLINICAL HISTORY: Left hip pain following fall.



TECHNIQUE: XR LT HIP (WITH OR WITHOUT PELVIS) 2 VIEWS



COMPARISON: None





FINDINGS/

IMPRESSION:  



Mild degenerative changes left hip. Degenerative changes right hip, incompletely evaluated. Pubic sym
physis and SI joints maintained. Partially visualized lumbar degenerative changes. No evidence of acu
te fracture, however, it is of note that the sacrum is largely obscured by overlying bowel gas and st
ool. Vascular calcifications.



Electronically signed by: Robb Shin DO (5/11/2022 7:41 PM) [facility]

## 2022-05-11 NOTE — RAD
EXAMINATION: CT PELVIS WITHOUT IV CONTRAST



CLINICAL HISTORY: Pelvic and left hip pain following fall.



TECHNIQUE: Noncontrast serial axial images obtained through the bony pelvis with sagittal and coronal
 reconstructions.



CT Dose Reduction Employed: One or more of the following individualized dose reduction techniques wer
e utilized for this examination:  1. Automated exposure control  2. Adjustment of the mA and/or kV ac
cording to patient size  3. Use of iterative reconstruction technique.



COMPARISON: Left hip radiographs 5/11/2022, CT chest/abdomen/pelvis 5/4/2022





FINDINGS:  



No evidence of acute fracture. Mild degenerative changes bilateral hips. Minimal degenerative changes
 bilateral SI joints. Pubic symphysis within normal limits. Partially visualized lumbar degenerative 
changes. Redemonstration of nonspecific 1.5 cm lytic focus in the L3 vertebral body. Nonspecific 8 mm
 sclerotic focus in the right iliac wing, possibly a bone island.



Tendinosis bilateral hamstring origins, incompletely evaluated. Mild gluteal insertional tendinosis b
ilaterally, incompletely evaluated. Tendons and muscles otherwise unremarkable on limited evaluation.




Mild asymmetric subcutaneous edema along the left flank. Vascular calcifications. Marked stool throug
hout the partially visualized colon, more pronounced compared to the prior study.





IMPRESSION:  



No evidence of acute fracture.



Mild degenerative changes bilateral hips.



Partially visualized marked colonic stool incidentally noted, correlate for constipation.



Multiple additional nonacute findings as described.



Electronically signed by: Robb Shin DO (5/11/2022 8:22 PM) [facility]

## 2022-05-11 NOTE — NUR
ADMISSION:



The patient, NAYELY BECKER, 76 y/o, M admitted by MEHDI ROSALES MD, was given 
written information regarding hospital policies, unit procedures and contact persons.  Pt 
arrived to room 111 via gurney, accompanied by [facility] and nursing sup. Pt here for left 
hip pain s/p fall. Pt is unable to care for himself at home. Pt was just recently admitted 
on 5/4 with sepsis and discharged home with wife and [facility] on 5/9. Wife 
would like pt to be placed at a nursing home as she is unable to care from him also. CM 
consulted. Discussed POC, V/U. Call light in reach. Bed alarmed for safety. 



Valuables were checked and LOGGED. Left in room with pt.

## 2022-05-12 VITALS — DIASTOLIC BLOOD PRESSURE: 84 MMHG | SYSTOLIC BLOOD PRESSURE: 114 MMHG

## 2022-05-12 VITALS — DIASTOLIC BLOOD PRESSURE: 78 MMHG | SYSTOLIC BLOOD PRESSURE: 118 MMHG

## 2022-05-12 VITALS — DIASTOLIC BLOOD PRESSURE: 85 MMHG | SYSTOLIC BLOOD PRESSURE: 111 MMHG

## 2022-05-12 VITALS — DIASTOLIC BLOOD PRESSURE: 71 MMHG | SYSTOLIC BLOOD PRESSURE: 119 MMHG

## 2022-05-12 RX ADMIN — NITROGLYCERIN SCH PATCH: 0.1 PATCH TRANSDERMAL at 12:37

## 2022-05-12 RX ADMIN — PANTOPRAZOLE SODIUM SCH MG: 40 TABLET, DELAYED RELEASE ORAL at 11:34

## 2022-05-12 RX ADMIN — DOCUSATE SODIUM SCH MG: 100 CAPSULE, LIQUID FILLED ORAL at 11:33

## 2022-05-12 RX ADMIN — CYCLOBENZAPRINE HYDROCHLORIDE SCH MG: 10 TABLET, FILM COATED ORAL at 21:12

## 2022-05-12 RX ADMIN — LOSARTAN POTASSIUM SCH MG: 50 TABLET, FILM COATED ORAL at 11:34

## 2022-05-12 RX ADMIN — GABAPENTIN SCH MG: 100 CAPSULE ORAL at 21:11

## 2022-05-12 RX ADMIN — MORPHINE SULFATE PRN MG: 2 INJECTION, SOLUTION INTRAMUSCULAR; INTRAVENOUS at 16:19

## 2022-05-12 RX ADMIN — NIACIN SCH MG: 500 TABLET, FILM COATED, EXTENDED RELEASE ORAL at 11:34

## 2022-05-12 RX ADMIN — MORPHINE SULFATE SCH MG: 30 TABLET, EXTENDED RELEASE ORAL at 21:11

## 2022-05-12 RX ADMIN — CEFDINIR SCH MG: 300 CAPSULE ORAL at 21:10

## 2022-05-12 RX ADMIN — CETIRIZINE HYDROCHLORIDE SCH MG: 10 TABLET, FILM COATED ORAL at 11:33

## 2022-05-12 RX ADMIN — Medication SCH PKT: at 21:12

## 2022-05-12 RX ADMIN — DOCUSATE SODIUM SCH MG: 100 CAPSULE, LIQUID FILLED ORAL at 21:11

## 2022-05-12 RX ADMIN — MORPHINE SULFATE PRN MG: 2 INJECTION, SOLUTION INTRAMUSCULAR; INTRAVENOUS at 10:18

## 2022-05-12 RX ADMIN — Medication SCH MG: at 21:11

## 2022-05-12 RX ADMIN — Medication SCH CAP: at 17:59

## 2022-05-12 RX ADMIN — POLYVINYL ALCOHOL, POVIDONE SCH DROP: 14; 6 SOLUTION/ DROPS OPHTHALMIC at 21:12

## 2022-05-12 RX ADMIN — MORPHINE SULFATE SCH MG: 30 TABLET, EXTENDED RELEASE ORAL at 14:20

## 2022-05-12 RX ADMIN — VITAMIN D, TAB 1000IU (100/BT) SCH UNIT: 25 TAB at 11:33

## 2022-05-12 RX ADMIN — NIACIN SCH MG: 500 TABLET, FILM COATED, EXTENDED RELEASE ORAL at 21:11

## 2022-05-12 RX ADMIN — CYCLOBENZAPRINE HYDROCHLORIDE SCH MG: 10 TABLET, FILM COATED ORAL at 11:33

## 2022-05-12 NOTE — NUR
Pt is very pleasant and cooperative. Pt is A+Ox3 and participates in self-care. Though 
listed as assist x2, he was able to get himself to the toilet and wash hands and back to bed 
with stand-by assist. He also independently ate his breakfast. Wife is supportive and 
pleasant and brings any and all paperwork she thinks we may need, but needs support to care 
for him on hospice.

## 2022-05-12 NOTE — NUR
Pt's wife Allie called to check on pt, update given. Will be up in the morning to talk to 
 regarding possible placement.

## 2022-05-13 VITALS — DIASTOLIC BLOOD PRESSURE: 66 MMHG | SYSTOLIC BLOOD PRESSURE: 104 MMHG

## 2022-05-13 VITALS — SYSTOLIC BLOOD PRESSURE: 104 MMHG | DIASTOLIC BLOOD PRESSURE: 66 MMHG

## 2022-05-13 RX ADMIN — CEFDINIR SCH MG: 300 CAPSULE ORAL at 07:58

## 2022-05-13 RX ADMIN — MORPHINE SULFATE SCH MG: 30 TABLET, EXTENDED RELEASE ORAL at 07:54

## 2022-05-13 RX ADMIN — NITROGLYCERIN SCH PATCH: 0.1 PATCH TRANSDERMAL at 10:26

## 2022-05-13 RX ADMIN — Medication SCH CAP: at 07:57

## 2022-05-13 RX ADMIN — Medication SCH PKT: at 07:59

## 2022-05-13 RX ADMIN — LOSARTAN POTASSIUM SCH MG: 50 TABLET, FILM COATED ORAL at 08:05

## 2022-05-13 RX ADMIN — VITAMIN D, TAB 1000IU (100/BT) SCH UNIT: 25 TAB at 07:57

## 2022-05-13 RX ADMIN — POLYVINYL ALCOHOL, POVIDONE SCH DROP: 14; 6 SOLUTION/ DROPS OPHTHALMIC at 07:58

## 2022-05-13 RX ADMIN — CYCLOBENZAPRINE HYDROCHLORIDE SCH MG: 10 TABLET, FILM COATED ORAL at 07:57

## 2022-05-13 RX ADMIN — Medication SCH MG: at 07:56

## 2022-05-13 RX ADMIN — PANTOPRAZOLE SODIUM SCH MG: 40 TABLET, DELAYED RELEASE ORAL at 07:57

## 2022-05-13 RX ADMIN — GABAPENTIN SCH MG: 100 CAPSULE ORAL at 07:52

## 2022-05-13 RX ADMIN — DOCUSATE SODIUM SCH MG: 100 CAPSULE, LIQUID FILLED ORAL at 07:57

## 2022-05-13 RX ADMIN — CETIRIZINE HYDROCHLORIDE SCH MG: 10 TABLET, FILM COATED ORAL at 07:57

## 2022-05-13 RX ADMIN — NIACIN SCH MG: 500 TABLET, FILM COATED, EXTENDED RELEASE ORAL at 07:56

## 2022-05-13 NOTE — DISCH
DISCHARGE ORDERS


DISCHARGE DATE:  May 13, 2022


FINAL DIAGNOSIS


lung cancer with metastatic disease


chronic pruritus secondary to tumor


moderate protein malnutrition


CONDITION AT DISCHARGE:  Stable


Code Status:  DNR/DNI


SNF STAY <30 DAYS:  Yes


HOSPICE:  Yes


HOSPICE EVALUATE & TREAT:  Yes


ADMIT TO LTAC:  No


POST DISCHARGE ORDERS:


ACTIVITY ORDERS:  Activity as tolerated


DIET AFTER DISCHARGE:  Regular





TREATMENT/EQUIPMENT ORDERS:


ADAPTIVE EQUIPMENT NEEDED:  None





DISCHARGE MEDICATIONS:


Home Meds


Active Scripts


Cefdinir (CEFDINIR) 300 Mg Capsule, 1 CAP PO BID for pneumonia, #14 CAP


   Prov:MEHDI ROSALES MD         5/9/22


Lorazepam (ATIVAN) 1 Mg Tablet, 1 TAB PO BID for anxiety MDD 2 Tablet(s) for 30 

Days, #60 TAB 0 Refills


   Prov:MEHDI ROSALES MD         5/9/22


Cetirizine Hcl (CETIRIZINE HCL) 10 Mg Tablet, 10 MG PO DAILY for itch fro cancer

for 30 Days, #30 TAB


   Prov:MEHDI ROSALES MD         5/9/22


Reported Medications


Niacin (Inositol Niacinate) (NIACIN 500 MG CAPSULE) 500 Mg Capsule, 1 CAP PO 

DAILY for SUPPLEMENT for 30 Days, #30 CAP 0 Refills


   5/11/22


Cyclobenzaprine Hcl (CYCLOBENZAPRINE HCL) 10 Mg Tablet, 1 TAB PO BID for MUSCLE 

SPASMS, #90 TAB


   5/11/22


Ferrous Sulfate (FERROUS SULFATE) 325 Mg Tablet.dr, 325 MG PO BID for ANEMIA, 

TAB


   5/11/22


Hydrocodone/Acetaminophen (Hydrocodone-Acetamin 5-325 mg) 1 Each Tablet, 1 TAB 

PO PRN Q6HRS PRN for PAIN


   5/4/22


Morphine Sulfate (MORPHINE SULFATE ER) 30 Mg Tablet.er, 1 TAB PO TID for CANCER 

PAIN


   5/4/22


Hydroxyzine Hcl (HYDROXYZINE HCL) 25 Mg Tablet, 25 MG PO PRN QID PRN for 

ITCHING, TAB


   5/4/22


Docusate Sodium (COLACE) 100 Mg Capsule, 1 CAP PO BID for STOOL SOFTENER for 30 

Days, #60 CAP 0 Refills


   5/4/22


Psyllium Husk/Aspartame (METAMUCIL FIBER SINGLES PACKET) 3.4 Gm Powd.pack, 3.4 

GM PO BID for CONSTIPATION, PKT


   5/4/22


Lactobacillus Acidophilus (Probiotic Acidophilus) 1 Each Tablet, 1 EACH PO 

BIDWMEALS for GI HEALTH, TAB


   5/4/22


Gabapentin (GABAPENTIN **) 100 Mg Capsule, 100 MG PO BID for NEUROGENIC PAIN, 

CAP


   5/4/22


Propylene Glycol/Peg 400 (Genteal Tears Severe Gel Drops) 8 Ml Drops.gel, 1 DROP

OU BID for DRY EYES, DROP


   1/5/22


Nitroglycerin (NITRO-DUR 0.1mg/hr) 1 Each Patch.td24, 2 MG TD DAILY for HEART


   Last Dose:


   


   Next dose:


   8/4/17


Losartan Potassium (LOSARTAN POTASSIUM **) 50 Mg Tablet, 50 MG PO DAILY for 

BLOOD PRESSURE


   Last Dose:


   


   Next Dose Due:


   8/4/17


Cholecalciferol (Vitamin D3) (VITAMIN D3) 1,000 Unit Tablet, 1 TAB PO DAILY for 

SUPPLEMENT


   Last Dose:


   


   Next Dose Due:


   8/4/17


Omeprazole (OMEPRAZOLE) 20 Mg Capsule.dr, 1 CAP PO HS for REFLUX


   Last dose:


   


   Next Dose Due:


   8/4/17











MEHDI ROSALES MD           May 13, 2022 09:35

## 2022-05-13 NOTE — HP
DATE OF SERVICE: 05/12/2022

ADMIT DATE: 05/11/2022

HISTORY OF PRESENT ILLNESS:  A 77-year-old male who has a terminal lung cancer. 

Apparently, the patient came in extremely weak, unable to mobilize.  His family 

was unable to take care of him at home.  He had been falling, lost his balance 

several times, hitting his head.  Denies any loss of consciousness or seizure 

activity.  The patient was brought in for further evaluation, stabilization, and

[facility]



PAST MEDICAL HISTORY:  Left lower lobe cancer as well as pneumonia in the past. 

The patient has gone through therapy down at , but apparently not successful. 

History of congestive heart failure, coronary artery disease, 

hypercholesterolemia, GERD, arthritis, back pain.  He has had fusion of his 

neck, repair.  The patient has previous history of smoking.  Influenza, 

pneumococcal, COVID vaccines all up to date.



FAMILY HISTORY:  Breast cancer, arthritis.



ALLERGIES:  ADVERSE REACTION TO ADHESIVE TAPE.



MEDICATIONS:  At home include that of Zyrtec, cefdinir 300 mg b.i.d., 

cyclobenzaprine 10, ferrous sulfate, niacin, nitroglycerin, losartan, morphine 

sulfate, hydrocodone, potassium 40, gabapentin 100 mg b.i.d., lorazepam 1 mg, 

hydroxyzine for itching from the cancer, docusate sodium.



SOCIAL HISTORY:  The patient has about a 40-50 pack year history of smoking.  He

is a DNR.



REVIEW OF SYSTEMS:  The patient is not able to give much of a history.  Also has

just generalized weakness, unable to support himself as he falls continually.  

Denies chest pain.  Does have shortness of breath and generalized achiness.



PHYSICAL EXAMINATION:

GENERAL:  This is a pleasant white male, looking cachectic.  He is very weak.  

He is wearing a neck brace from previous surgery.

VITAL SIGNS:  Blood pressure 110/60, respirations 18, pulse 88-90, afebrile, 2 

liters of nasal cannula at 95.

HEENT:  The patient's head was atraumatic, normocephalic.  Eyes:  PERRLA. Mouth 

and throat were normal.

NECK:  As noted was stiff from previous surgeries and a collar on him.

LUNGS:  Diminished primarily in the left lower lobe, otherwise unremarkable.

CARDIOVASCULAR:  Regular sinus rhythm.

ABDOMEN:  The patient's abdomen was soft, scaphoid as the patient has been 

losing weight dramatically, loose skin, dry skin.

EXTREMITIES:  Without clubbing, cyanosis or edema.  Marked musculoskeletal 

atrophy with loss of muscle mass secondary to his cancer and poor nutrition.

NEUROLOGIC:  Alert, showing marked muscle weakness in both the upper and lower 

extremities.  Reflexes diminished throughout.  The patient is not able to give 

much in the way of strength in the proximal muscles of the legs or the arms.  

 is weak bilaterally.



LABORATORY DATA:  The patient's white count is 12, hemoglobin 11, and hematocrit

35.  Chemistries: Sodium and potassium 136, 4.5.  BUN and creatinine 18 and 0.8.

Calcium elevated, probably from metastatic disease.  The patient has lung cancer

with mets to the vertebral body and albumin low at 2.4.



IMPRESSION AND PLAN:  Left lower lobe pneumonia, generalized weakness with 

marked musculoskeletal atrophy and weakness to the musculoskeletal system.  

Multiple falls, multiple head contusions, terminal left lung cancer, severe 

protein malnutrition, hypercalcemia from the mets to the bone itself, previous 

neck surgery with fusion of some of the cervical spine, constipation, muscle 

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  The patient

will be admitted, continued to be monitored.  Pain management ____ he is having 

severe pain of 8-9/10.  Also, we will make arrangements for him to be placed for

continued care as he is terminal and [facility] is his 

hospice of choice.







EMA/JANELLE/CHRISSIE

DR: Sunil   DD: 05/12/2022 18:43

DT: 05/12/2022 20:54   TID: [id number]

## 2022-05-13 NOTE — NUR
Am assessment completed. Pt. has been up to BR with standby assist X 4 this shift. Cont. to 
deny discomfort this AM. No further changes from previous assessment.

## 2022-05-13 NOTE — NUR
PT DISCHARGED AT APPROX 1045 TO [facility] FOR [facility]AC[facility] VIA EMS. REPORT CALLED TO 
[phone]. SPOKE WITH JUANITO AND NELSON TO CALL BACK FOR REPORT.

## 2022-05-16 ENCOUNTER — HOSPITAL ENCOUNTER (EMERGENCY)
Dept: HOSPITAL 63 - ER | Age: 77
Discharge: HOME | End: 2022-05-16
Payer: MEDICARE

## 2022-05-16 VITALS — HEIGHT: 68 IN | WEIGHT: 164.02 LBS | BODY MASS INDEX: 24.86 KG/M2

## 2022-05-16 VITALS — DIASTOLIC BLOOD PRESSURE: 95 MMHG | SYSTOLIC BLOOD PRESSURE: 145 MMHG

## 2022-05-16 DIAGNOSIS — Z85.118: ICD-10-CM
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
## 2022-05-16 DIAGNOSIS — Z90.89: ICD-10-CM
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
## 2022-05-16 DIAGNOSIS — F03.90: Primary | ICD-10-CM

## 2022-05-16 LAB
% LYMPHS: 3 % (ref 24–48)
% MONOS: 12 % (ref 0–10)
% SEGS: 85 % (ref 35–66)
ALBUMIN SERPL-MCNC: 2.5 G/DL (ref 3.4–5)
ALBUMIN/GLOB SERPL: 0.6 {RATIO} (ref 1–1.7)
ALP SERPL-CCNC: 68 U/L (ref 46–116)
ALT SERPL-CCNC: 29 U/L (ref 16–63)
ANION GAP SERPL CALC-SCNC: 9 MMOL/L (ref 6–14)
APTT PPP: YELLOW S
AST SERPL-CCNC: 36 U/L (ref 15–37)
BACTERIA #/AREA URNS HPF: 0 /HPF
BASOPHILS # BLD AUTO: 0.1 X10^3/UL (ref 0–0.2)
BASOPHILS NFR BLD: 0 % (ref 0–3)
BILIRUB SERPL-MCNC: 0.4 MG/DL (ref 0.2–1)
BUN/CREAT SERPL: 15 (ref 6–20)
CA-I SERPL ISE-MCNC: 12 MG/DL (ref 8–26)
CALCIUM SERPL-MCNC: 10.7 MG/DL (ref 8.5–10.1)
CHLORIDE SERPL-SCNC: 103 MMOL/L (ref 98–107)
CO2 SERPL-SCNC: 25 MMOL/L (ref 21–32)
CREAT SERPL-MCNC: 0.8 MG/DL (ref 0.7–1.3)
EOSINOPHIL NFR BLD: 0 % (ref 0–3)
EOSINOPHIL NFR BLD: 0.1 X10^3/UL (ref 0–0.7)
ERYTHROCYTE [DISTWIDTH] IN BLOOD BY AUTOMATED COUNT: 13.3 % (ref 11.5–14.5)
FIBRINOGEN PPP-MCNC: CLEAR MG/DL
GFR SERPLBLD BASED ON 1.73 SQ M-ARVRAT: 93.5 ML/MIN
GLOBULIN SER-MCNC: 4.4 G/DL (ref 2.2–3.8)
GLUCOSE SERPL-MCNC: 110 MG/DL (ref 70–99)
GLUCOSE UR STRIP-MCNC: (no result) MG/DL
HCT VFR BLD CALC: 37.8 % (ref 39–53)
HGB BLD-MCNC: 12 G/DL (ref 13–17.5)
HYALINE CASTS #/AREA URNS LPF: (no result) /HPF
LYMPHOCYTES # BLD: 0.9 X10^3/UL (ref 1–4.8)
LYMPHOCYTES NFR BLD AUTO: 6 % (ref 24–48)
MCH RBC QN AUTO: 28 PG (ref 25–35)
MCHC RBC AUTO-ENTMCNC: 32 G/DL (ref 31–37)
MCV RBC AUTO: 87 FL (ref 79–100)
MONO #: 1.4 X10^3/UL (ref 0–1.1)
MONOCYTES NFR BLD: 8 % (ref 0–9)
NEUT #: 13.8 X10^3UL (ref 1.8–7.7)
NEUTROPHILS NFR BLD AUTO: 85 % (ref 31–73)
NITRITE UR QL STRIP: (no result)
PLATELET # BLD AUTO: 403 X10^3/UL (ref 140–400)
PLATELET # BLD EST: ADEQUATE 10*3/UL
POTASSIUM SERPL-SCNC: 4.1 MMOL/L (ref 3.5–5.1)
PROT SERPL-MCNC: 6.9 G/DL (ref 6.4–8.2)
RBC # BLD AUTO: 4.32 X10^6/UL (ref 4.3–5.7)
RBC #/AREA URNS HPF: (no result) /HPF (ref 0–2)
SODIUM SERPL-SCNC: 137 MMOL/L (ref 136–145)
SP GR UR STRIP: 1.02
SQUAMOUS #/AREA URNS LPF: (no result) /LPF
UROBILINOGEN UR-MCNC: 0.2 MG/DL
WBC # BLD AUTO: 16.1 X10^3/UL (ref 4–11)
WBC #/AREA URNS HPF: (no result) /HPF (ref 0–4)

## 2022-05-16 PROCEDURE — 73030 X-RAY EXAM OF SHOULDER: CPT

## 2022-05-16 PROCEDURE — 70450 CT HEAD/BRAIN W/O DYE: CPT

## 2022-05-16 PROCEDURE — 96372 THER/PROPH/DIAG INJ SC/IM: CPT

## 2022-05-16 PROCEDURE — 36415 COLL VENOUS BLD VENIPUNCTURE: CPT

## 2022-05-16 PROCEDURE — 85025 COMPLETE CBC W/AUTO DIFF WBC: CPT

## 2022-05-16 PROCEDURE — 80053 COMPREHEN METABOLIC PANEL: CPT

## 2022-05-16 PROCEDURE — 85007 BL SMEAR W/DIFF WBC COUNT: CPT

## 2022-05-16 PROCEDURE — 81001 URINALYSIS AUTO W/SCOPE: CPT

## 2022-05-16 PROCEDURE — 99285 EMERGENCY DEPT VISIT HI MDM: CPT

## 2022-05-16 PROCEDURE — 72125 CT NECK SPINE W/O DYE: CPT

## 2022-05-16 PROCEDURE — 73502 X-RAY EXAM HIP UNI 2-3 VIEWS: CPT

## 2022-05-16 NOTE — RAD
EXAMINATION: CT HEAD AND C-SPINE WO



CLINICAL HISTORY: Head and neck pain following fall.



TECHNIQUE:

Serial axial images without IV contrast were obtained from the vertex to the foramen magnum.



CT of the cervical spine without IV contrast. Spiral, high resolution axial images were obtained from
 the skull base to the cervicothoracic junction with sagittal and coronal planar reconstructions.



CT Dose Reduction Employed: One or more of the following individualized dose reduction techniques wer
e utilized for this examination:  1. Automated exposure control  2. Adjustment of the mA and/or kV ac
cording to patient size  3. Use of iterative reconstruction technique.



COMPARISON: CT head 3/20/2022, CT head and C-spine 1/14/2022





FINDINGS:  



BRAIN:

Acute Change: Mild subcutaneous contusion along the anterior inferior left frontal scalp. No evidence
 of an acute parenchymal contusion or other acute parenchymal process.

  

Hemorrhage: No evidence of acute intracranial hemorrhage.



Mass Lesion/Mass Effect: No evidence of intracranial mass or extraaxial fluid collection. No signific
ant mass effect.

 

Chronic Change: Patchy hypoattenuation in the supratentorial white matter, nonspecific but likely rep
resents moderate microvascular ischemia. Atherosclerotic calcification of the anterior and posterior 
circulation.



Parenchyma: Mild to moderate generalized volume loss. 



Ventricles: Ventricular enlargement concordant with degree of parenchymal volume loss.



Paranasal Sinuses and Skull Base: Visualized paranasal sinuses clear. No evidence of acute calvarial 
fracture.





C-SPINE: 

Alignment: Straightening of the normal cervical lordosis.



Osseous Structures: No evidence of acute fracture. Slight C3-4 anterolisthesis, similar to prior stud
y and likely degenerative. C1-C4 laminectomies with C1-C5 posterior stabilization hardware. C4-5 and 
C5-6 ACDF.



Degenerative Changes: Multilevel disc space narrowing. Multilevel neural foraminal narrowing, greates
t in the mid cervical spine. No evidence of high-grade osseous spinal stenosis. Multilevel facet arth
ropathy.



Cervical Soft Tissues: No prevertebral soft tissue swelling. Partially visualized left apical pleural
 thickening/scarring. Vascular calcifications. Partially visualized right internal jugular catheter.





IMPRESSION:  



BRAIN:

Mild subcutaneous contusion left frontal scalp.



No evidence of acute intracranial abnormality.



C-SPINE:

No evidence of acute osseous abnormality involving the cervical spine.



Multilevel postoperative and degenerative changes as described.



Electronically signed by: Robb Shin DO (5/16/2022 9:50 AM) [facility]

## 2022-05-16 NOTE — RAD
Exam: XR LT HIP (WITH OR WITHOUT PELVIS) 2 VIEWS



History: Fall. Pain.



Comparison: 5/11/2022



Findings:

Osseous mineralization is normal. No acute fracture or dislocaton. Bilateral femoral acetabular degen
erative changes with subchondral sclerosis and cystic change. Atherosclerotic vascular changes. The p
ubic rami are intact. Degenerative changes of the lower lumbar spine. Sacroiliac joints are unremarka
ble. Calcified pelvic phleboliths.



Impression: 

1.  No acute osseous abnormality in the left hip and pelvis.



Electronically signed by: Kelby Neely MD (5/16/2022 9:17 AM) [id number]

## 2022-05-16 NOTE — RAD
Exam: XR SHOULDER_LEFT 2+ VIEWS



History: Fall. Pain.



Comparison: None.



Findings:

Osseous mineralization is normal. No fracture or dislocation is identified. There is superior subluxa
tion of the humeral head within the glenoid with narrowing of the subacromial space. Degenerative alfredito
nges that acromioclavicular and glenohumeral joints. Partially visualized multilevel cervical fixatio
n. Hypoventilatory atrophic changes in the left lung. Atherosclerotic calcification aortic arch.



Impression: 

1.  No acute fracture or dislocation of the left shoulder.

2.  High riding humeral head with narrowing of subacromial space suggests rotator cuff tear.



Electronically signed by: Kelby Neely MD (5/16/2022 9:15 AM) [id number]

## 2022-05-16 NOTE — PHYS DOC
Past History


Additional Past Medical Histor:  Lung Cancer


Past Surgical History:  Cervical Fusion, Tonsillectomy


Alcohol Use:  None





General Adult


EDM:


Chief Complaint:  MECHANICAL FALL





HPI:


HPI:


77-year-old male presents via EMS from his nursing home due to ground-level fall

this morning.  The patient does not remember what happened.  He is normally 

alert to person and baseline.  EMS reports he was moving around and fell onto 

the ground.  It is unclear if this was witnessed or unwitnessed.  Since that 

time, the patient has intermittently complained of left shoulder and left hip 

pain.  The patient has not reported to be on a blood thinner.  The patient is 

normally on 2 L of oxygen at all times.  He has no other significant complaints 

at this time.





Review of Systems:


Review of Systems:


Constitutional:  Denies fever or chills 


Eyes:  Denies change in visual acuity 


HENT:  Denies nasal congestion or sore throat 


Respiratory:  Denies cough or shortness of breath 


Cardiovascular:  Denies chest pain or edema 


GI:  Denies abdominal pain, nausea, vomiting, bloody stools or diarrhea 


: Denies dysuria 


Musculoskeletal: Left shoulder and left hip pain


Integument:  Denies rash 


Neurologic:  Denies headache, focal weakness or sensory changes 


Endocrine:  Denies polyuria or polydipsia 


Lymphatic:  Denies swollen glands 


Psychiatric:  Denies depression or anxiety





Allergies:


Allergies:





Allergies








Coded Allergies Type Severity Reaction Last Updated Verified


 


  adhesive tape Allergy Unknown  1/5/22 Yes











Physical Exam:


PE:





Constitutional: Well developed, well nourished, no acute distress, non-toxic 

appearance. []


HENT: Normocephalic, atraumatic, bilateral external ears normal, oropharynx 

moist, no oral exudates, nose normal. []


Eyes: PERRLA, EOMI, conjunctiva normal, no discharge. [] 


Neck: Normal range of motion, no tenderness, supple, no stridor. [] 


Cardiovascular: Heart rate 103, regular rhythm, 3/6 systolic murmur []


Lungs & Thorax:  Bilateral breath sounds clear to auscultation []


Abdomen: Bowel sounds normal, soft, no tenderness, no masses, no pulsatile 

masses. [] 


Skin: Warm, dry, no erythema, no rash. [] 


Back: No tenderness, no CVA tenderness. [] 


Extremities: No tenderness, no cyanosis, no clubbing, ROM intact, no edema. [] 


Neurologic: Alert to person, normal motor function, normal sensory function, no 

focal deficits noted. []


Psychologic: Affect normal, mood normal. []





EKG:


EKG:


[]





Radiology/Procedures:


Radiology/Procedures:


[]


Impressions:


Exam: XR SHOULDER_LEFT 2+ VIEWS





History: Fall. Pain.





Comparison: None.





Findings:


Osseous mineralization is normal. No fracture or dislocation is identified. 

There is superior subluxation of the humeral head within the glenoid with 

narrowing of the subacromial space. Degenerative changes that acromioclavicular 

and glenohumeral joints. Partially visualized multilevel cervical fixation. 

Hypoventilatory atrophic changes in the left lung. Atherosclerotic calcification

 aortic arch.





Impression: 


1.  No acute fracture or dislocation of the left shoulder.


2.  High riding humeral head with narrowing of subacromial space suggests 

rotator cuff tear.





Electronically signed by: Kelby Queen MD (5/16/2022 9:15 AM) [id number]














DICTATED AND SIGNED BY:     KELBY QUEEN MD


DATE:     05/16/22 0914





CC: HEATHER FONTANEZ DO; MEHDI ROSALES MD ~

















Exam: XR LT HIP (WITH OR WITHOUT PELVIS) 2 VIEWS





History: Fall. Pain.





Comparison: 5/11/2022





Findings:


Osseous mineralization is normal. No acute fracture or dislocaton. Bilateral 

femoral acetabular degenerative changes with subchondral sclerosis and cystic 

change. Atherosclerotic vascular changes. The pubic rami are intact. 

Degenerative changes of the lower lumbar spine. Sacroiliac joints are 

unremarkable. Calcified pelvic phleboliths.





Impression: 


1.  No acute osseous abnormality in the left hip and pelvis.





Electronically signed by: Kelby Queen MD (5/16/2022 9:17 AM) [id number]














DICTATED AND SIGNED BY:     KELBY QUEEN MD


DATE:     05/16/22 0915





CC: HEATHER FONTANEZ DO; MEHDI ROSALES MD ~














EXAMINATION: CT HEAD AND C-SPINE WO





CLINICAL HISTORY: Head and neck pain following fall.





TECHNIQUE:


Serial axial images without IV contrast were obtained from the vertex to the 

foramen magnum.





CT of the cervical spine without IV contrast. Spiral, high resolution axial 

images were obtained from the skull base to the cervicothoracic junction with 

sagittal and coronal planar reconstructions.





CT Dose Reduction Employed: One or more of the following individualized dose 

reduction techniques were utilized for this examination:  1. Automated exposure 

control  2. Adjustment of the mA and/or kV according to patient size  3. Use of 

iterative reconstruction technique.





COMPARISON: CT head 3/20/2022, CT head and C-spine 1/14/2022








FINDINGS:  





BRAIN:


Acute Change: Mild subcutaneous contusion along the anterior inferior left 

frontal scalp. No evidence of an acute parenchymal contusion or other acute 

parenchymal process.


  


Hemorrhage: No evidence of acute intracranial hemorrhage.





Mass Lesion/Mass Effect: No evidence of intracranial mass or extraaxial fluid 

collection. No significant mass effect.


 


Chronic Change: Patchy hypoattenuation in the supratentorial white matter, 

nonspecific but likely represents moderate microvascular ischemia. 

Atherosclerotic calcification of the anterior and posterior circulation.





Parenchyma: Mild to moderate generalized volume loss. 





Ventricles: Ventricular enlargement concordant with degree of parenchymal volume

 loss.





Paranasal Sinuses and Skull Base: Visualized paranasal sinuses clear. No 

evidence of acute calvarial fracture.








C-SPINE: 


Alignment: Straightening of the normal cervical lordosis.





Osseous Structures: No evidence of acute fracture. Slight C3-4 anterolisthesis, 

similar to prior study and likely degenerative. C1-C4 laminectomies with C1-C5 

posterior stabilization hardware. C4-5 and C5-6 ACDF.





Degenerative Changes: Multilevel disc space narrowing. Multilevel neural 

foraminal narrowing, greatest in the mid cervical spine. No evidence of high-

grade osseous spinal stenosis. Multilevel facet arthropathy.





Cervical Soft Tissues: No prevertebral soft tissue swelling. Partially 

visualized left apical pleural thickening/scarring. Vascular calcifications. 

Partially visualized right internal jugular catheter.








IMPRESSION:  





BRAIN:


Mild subcutaneous contusion left frontal scalp.





No evidence of acute intracranial abnormality.





C-SPINE:


No evidence of acute osseous abnormality involving the cervical spine.





Multilevel postoperative and degenerative changes as described.





Electronically signed by: Robb Coto DO (5/16/2022 9:50 AM) [facility]














DICTATED AND SIGNED BY:     ROBB COTO DO


DATE:     05/16/22 0936





CC: HEATHER FONTANEZ DO; MEHDI ROSALES MD ~





Heart Score:


C/O Chest Pain:  N/A


Risk Factors:


Risk Factors:  DM, Current or recent (<one month) smoker, HTN, HLP, family 

history of CAD, obesity.


Risk Scores:


Score 0 - 3:  2.5% MACE over next 6 weeks - Discharge Home


Score 4 - 6:  20.3% MACE over next 6 weeks - Admit for Clinical Observation


Score 7 - 10:  72.7% MACE over next 6 weeks - Early Invasive Strategies





Course & Med Decision Making:


Course & Med Decision Making


Pertinent Labs and Imaging studies reviewed. (See chart for details)


The patient's head CT is negative for acute findings.  Hip x-ray negative for ac

violetta finding.  His shoulder x-ray shows possible rotator cuff tear, but no acute 

bony abnormality.  The patient's labs show anemia and elevated white count.  

Review of the chart shows that these values are consistent with previous.  The 

patient's urinalysis is negative for infection.  Have given him Norco 5/325 for 

his discomfort.  I have also given him 2 of Ativan IM.  Patient is stable for 

discharge at this time.


[]





Dragon Disclaimer:


Dragon Disclaimer:


This electronic medical record was generated, in whole or in part, using a voice

 recognition dictation system.





Departure


Departure:


Impression:  


   Primary Impression:  


   Fall


   Additional Impression:  


   Dementia


Disposition:  01 HOME / SELF CARE / HOMELESS


Condition:  STABLE


Referrals:  


MEHDI ROSALES MD (PCP)


Patient Instructions:  Fall Prevention and Home Safety, Easy-to-Read











HEATHER FONTANEZ DO                 May 16, 2022 08:18